# Patient Record
Sex: FEMALE | Race: WHITE | Employment: UNEMPLOYED | URBAN - METROPOLITAN AREA
[De-identification: names, ages, dates, MRNs, and addresses within clinical notes are randomized per-mention and may not be internally consistent; named-entity substitution may affect disease eponyms.]

---

## 2017-01-10 ENCOUNTER — OFFICE VISIT (OUTPATIENT)
Dept: INTERNAL MEDICINE | Age: 57
End: 2017-01-10

## 2017-01-10 VITALS
DIASTOLIC BLOOD PRESSURE: 88 MMHG | HEIGHT: 57 IN | HEART RATE: 60 BPM | SYSTOLIC BLOOD PRESSURE: 120 MMHG | TEMPERATURE: 96.9 F

## 2017-01-10 DIAGNOSIS — R46.89 BEHAVIORAL CHANGE: ICD-10-CM

## 2017-01-10 DIAGNOSIS — E66.2 MORBID OBESITY WITH ALVEOLAR HYPOVENTILATION (HCC): Chronic | ICD-10-CM

## 2017-01-10 DIAGNOSIS — Z87.09 HISTORY OF ASPIRATION PNEUMONITIS: Primary | ICD-10-CM

## 2017-01-10 LAB
BASOPHILS ABSOLUTE: 0 /ΜL
BASOPHILS RELATIVE PERCENT: 0.4 %
EOSINOPHILS ABSOLUTE: 0.2 /ΜL
EOSINOPHILS RELATIVE PERCENT: 6 %
HCT VFR BLD CALC: 40 % (ref 36–46)
HEMOGLOBIN: 13.2 G/DL (ref 12–16)
LYMPHOCYTES ABSOLUTE: 2 /ΜL
LYMPHOCYTES RELATIVE PERCENT: 47 %
MCH RBC QN AUTO: 34 PG
MCHC RBC AUTO-ENTMCNC: 33.2 G/DL
MCV RBC AUTO: 102.5 FL
MONOCYTES ABSOLUTE: 0.5 /ΜL
MONOCYTES RELATIVE PERCENT: 13.2 %
NEUTROPHILS ABSOLUTE: 1.2 /ΜL
NEUTROPHILS RELATIVE PERCENT: 26 %
PDW BLD-RTO: 15 %
PLATELET # BLD: 58 K/ΜL
PMV BLD AUTO: NORMAL FL
RBC # BLD: 3.9 10^6/ΜL
WBC # BLD: 3.9 10^3/ML

## 2017-01-10 PROCEDURE — 99213 OFFICE O/P EST LOW 20 MIN: CPT | Performed by: INTERNAL MEDICINE

## 2017-01-10 ASSESSMENT — ENCOUNTER SYMPTOMS
EYE REDNESS: 0
SHORTNESS OF BREATH: 1
WHEEZING: 0
COUGH: 0
ABDOMINAL DISTENTION: 0
VOICE CHANGE: 0
BLOOD IN STOOL: 0
TROUBLE SWALLOWING: 1

## 2017-02-07 LAB
BASOPHILS ABSOLUTE: 0 /ΜL
BASOPHILS RELATIVE PERCENT: 0.5 %
EOSINOPHILS ABSOLUTE: 0.1 /ΜL
EOSINOPHILS RELATIVE PERCENT: 1 %
HCT VFR BLD CALC: 39.5 % (ref 36–46)
HEMOGLOBIN: 12.5 G/DL (ref 12–16)
LYMPHOCYTES ABSOLUTE: 2.9 /ΜL
LYMPHOCYTES RELATIVE PERCENT: 47.9 %
MCH RBC QN AUTO: 33.4 PG
MCHC RBC AUTO-ENTMCNC: 31.7 G/DL
MCV RBC AUTO: 105.2 FL
MONOCYTES ABSOLUTE: 0.9 /ΜL
MONOCYTES RELATIVE PERCENT: 15.3 %
NEUTROPHILS ABSOLUTE: 2.1 /ΜL
NEUTROPHILS RELATIVE PERCENT: 35.3 %
PDW BLD-RTO: 15.3 %
PLATELET # BLD: 70 K/ΜL
PMV BLD AUTO: NORMAL FL
RBC # BLD: 3.75 10^6/ΜL
WBC # BLD: 6 10^3/ML

## 2017-02-14 LAB
BASOPHILS ABSOLUTE: 0 /ΜL
BASOPHILS RELATIVE PERCENT: 0.8 %
EOSINOPHILS ABSOLUTE: 0.1 /ΜL
EOSINOPHILS RELATIVE PERCENT: 1.9 %
HCT VFR BLD CALC: 37.5 % (ref 36–46)
HEMOGLOBIN: 12.7 G/DL (ref 12–16)
LYMPHOCYTES ABSOLUTE: 2.4 /ΜL
LYMPHOCYTES RELATIVE PERCENT: 43.3 %
MCH RBC QN AUTO: 34.5 PG
MCHC RBC AUTO-ENTMCNC: 34 G/DL
MCV RBC AUTO: 101.5 FL
MONOCYTES ABSOLUTE: 0.7 /ΜL
MONOCYTES RELATIVE PERCENT: 12.5 %
NEUTROPHILS ABSOLUTE: 2.3 /ΜL
NEUTROPHILS RELATIVE PERCENT: 41.5 %
PDW BLD-RTO: 14.2 %
PLATELET # BLD: 72 K/ΜL
PMV BLD AUTO: NORMAL FL
RBC # BLD: 3.7 10^6/ΜL
WBC # BLD: 5.6 10^3/ML

## 2017-02-17 LAB
BASOPHILS ABSOLUTE: 0 /ΜL
BASOPHILS RELATIVE PERCENT: 0.6 %
EOSINOPHILS ABSOLUTE: 0.1 /ΜL
EOSINOPHILS RELATIVE PERCENT: 1.9 %
HCT VFR BLD CALC: 37.7 % (ref 36–46)
HEMOGLOBIN: 12.8 G/DL (ref 12–16)
LYMPHOCYTES ABSOLUTE: 2.4 /ΜL
LYMPHOCYTES RELATIVE PERCENT: 39.7 %
MCH RBC QN AUTO: 34.1 PG
MCHC RBC AUTO-ENTMCNC: 33.8 G/DL
MCV RBC AUTO: 100.7 FL
MONOCYTES ABSOLUTE: 0.8 /ΜL
MONOCYTES RELATIVE PERCENT: 12.4 %
NEUTROPHILS ABSOLUTE: 2.8 /ΜL
NEUTROPHILS RELATIVE PERCENT: 45.4 %
PDW BLD-RTO: 14 %
PLATELET # BLD: 67 K/ΜL
PMV BLD AUTO: NORMAL FL
RBC # BLD: 3.75 10^6/ΜL
WBC # BLD: 6.1 10^3/ML

## 2017-03-14 ENCOUNTER — TELEPHONE (OUTPATIENT)
Dept: SURGERY | Age: 57
End: 2017-03-14

## 2017-03-14 DIAGNOSIS — R13.10 DYSPHAGIA, UNSPECIFIED TYPE: Primary | ICD-10-CM

## 2017-03-21 LAB
BASOPHILS ABSOLUTE: 0 /ΜL
BASOPHILS RELATIVE PERCENT: 0.4 %
EOSINOPHILS ABSOLUTE: 0.1 /ΜL
EOSINOPHILS RELATIVE PERCENT: 1.3 %
HCT VFR BLD CALC: 40.3 % (ref 36–46)
HEMOGLOBIN: 13.7 G/DL (ref 12–16)
LYMPHOCYTES ABSOLUTE: 2.3 /ΜL
LYMPHOCYTES RELATIVE PERCENT: 30.2 %
MCH RBC QN AUTO: 33.9 PG
MCHC RBC AUTO-ENTMCNC: 34.1 G/DL
MCV RBC AUTO: 99.6 FL
MONOCYTES ABSOLUTE: 1 /ΜL
MONOCYTES RELATIVE PERCENT: 13.1 %
NEUTROPHILS ABSOLUTE: 4.1 /ΜL
NEUTROPHILS RELATIVE PERCENT: 55 %
PDW BLD-RTO: 13 %
PLATELET # BLD: 82 K/ΜL
PMV BLD AUTO: NORMAL FL
RBC # BLD: 4.05 10^6/ΜL
WBC # BLD: 7.5 10^3/ML

## 2017-03-24 ENCOUNTER — HOSPITAL ENCOUNTER (OUTPATIENT)
Dept: INFUSION THERAPY | Age: 57
Setting detail: INFUSION SERIES
Discharge: HOME | End: 2017-03-24
Payer: MEDICARE

## 2017-03-24 PROCEDURE — 43760 CHANGE GASTROSTOMY TUBE PERCUTANEOUS W/O GUIDE: CPT | Performed by: SURGERY

## 2017-03-24 PROCEDURE — 43760 HC CHANGE OF GASTROSTOMY TUBE: CPT

## 2017-03-24 NOTE — PROGRESS NOTES
Arrived to [facility] via wheelchair accompanied by caregiver. She is here to get her gastric tube changed.

## 2017-03-25 NOTE — PROCEDURES
[facility]                        [address] [place], [address] [place] [address]                                    PROCEDURE NOTE    PATIENT NAME:  Igor Schaeffer                    :      1960  MED REC NO:    [id number]                         ROOM:     [facility]   ACCOUNT NO:    [de-identified]                        ADMISSION DATE: 2017  PHYSICIAN:     Channing Vanessa MD                   DATE OF PROCEDURE:  2017    PREOPERATIVE DIAGNOSES:  Mechanical complication gastrostomy tube, dysphagia. POSTOPERATIVE DIAGNOSES:  Mechanical complication gastrostomy tube, dysphagia. PROCEDURE PERFORMED:  Change of G-tube. SURGEON:  Dr. Zhanna Gaytan. CLINICAL NOTE:  This woman receives her nutrition and medication support  through a G-tube. The plastic on the tube is starting to break down, so the  tube needs replaced. She has had this done in the past.    PROCEDURE NOTE:  The procedure was done in the outpatient infusion area. She  remains in her wheelchair. The tube is positioned high in the epigastric  area, right underneath the bra. The tube was removed intact, and discarded. A new tube was lubricated and inserted with some difficulty. The patient was  not cooperative at first and had to be restrained. The tube was placed. It  flushed easily. There was return of gastric contents. The site was dressed  and she left the suite in good condition. Nutrition and medication support  will be restarted and the tube will be changed p.r.n.         Channing Vanessa MD      D: 2017 14:40:21  T: 2017 16:51:04  BP/nts  Job#: [id number]  Doc#: [id number]

## 2017-04-11 LAB
BASOPHILS ABSOLUTE: 0 /ΜL
BASOPHILS RELATIVE PERCENT: 0.5 %
EOSINOPHILS ABSOLUTE: 0.1 /ΜL
EOSINOPHILS RELATIVE PERCENT: 2.2 %
HCT VFR BLD CALC: 38 % (ref 36–46)
HEMOGLOBIN: 12.8 G/DL (ref 12–16)
LYMPHOCYTES ABSOLUTE: 2.5 /ΜL
LYMPHOCYTES RELATIVE PERCENT: 43 %
MCH RBC QN AUTO: 33.4 PG
MCHC RBC AUTO-ENTMCNC: 33.7 G/DL
MCV RBC AUTO: 99.2 FL
MONOCYTES ABSOLUTE: 0.8 /ΜL
MONOCYTES RELATIVE PERCENT: 13 %
NEUTROPHILS ABSOLUTE: 2.4 /ΜL
NEUTROPHILS RELATIVE PERCENT: 41.3 %
PDW BLD-RTO: 12.6 %
PLATELET # BLD: 92 K/ΜL
PMV BLD AUTO: NORMAL FL
RBC # BLD: 3.83 10^6/ΜL
WBC # BLD: 5.8 10^3/ML

## 2017-04-18 LAB
BASOPHILS ABSOLUTE: 0 /ΜL
BASOPHILS RELATIVE PERCENT: 0.4 %
EOSINOPHILS ABSOLUTE: 0.1 /ΜL
EOSINOPHILS RELATIVE PERCENT: 1.5 %
HCT VFR BLD CALC: 39.2 % (ref 36–46)
HEMOGLOBIN: 13.5 G/DL (ref 12–16)
LYMPHOCYTES ABSOLUTE: 2 /ΜL
LYMPHOCYTES RELATIVE PERCENT: 26.4 %
MCH RBC QN AUTO: 34 PG
MCHC RBC AUTO-ENTMCNC: 34.5 G/DL
MCV RBC AUTO: 98.4 FL
MONOCYTES ABSOLUTE: 0.9 /ΜL
MONOCYTES RELATIVE PERCENT: 12.1 %
NEUTROPHILS ABSOLUTE: 4.4 /ΜL
NEUTROPHILS RELATIVE PERCENT: 59.6 %
PDW BLD-RTO: 12.7 %
PLATELET # BLD: 77 K/ΜL
PMV BLD AUTO: NORMAL FL
RBC # BLD: 3.98 10^6/ΜL
WBC # BLD: 7.4 10^3/ML

## 2017-04-25 ENCOUNTER — OFFICE VISIT (OUTPATIENT)
Dept: CARDIOLOGY | Age: 57
End: 2017-04-25

## 2017-04-25 VITALS
DIASTOLIC BLOOD PRESSURE: 70 MMHG | WEIGHT: 185 LBS | OXYGEN SATURATION: 92 % | SYSTOLIC BLOOD PRESSURE: 102 MMHG | HEART RATE: 96 BPM | RESPIRATION RATE: 16 BRPM

## 2017-04-25 DIAGNOSIS — I50.30 DIASTOLIC HEART FAILURE, NYHA CLASS 1 (HCC): ICD-10-CM

## 2017-04-25 DIAGNOSIS — I10 ESSENTIAL HYPERTENSION: Primary | ICD-10-CM

## 2017-04-25 DIAGNOSIS — I95.1 CHRONIC ORTHOSTATIC HYPOTENSION: ICD-10-CM

## 2017-04-25 DIAGNOSIS — R00.0 SINUS TACHYCARDIA: ICD-10-CM

## 2017-04-25 PROCEDURE — 1036F TOBACCO NON-USER: CPT | Performed by: INTERNAL MEDICINE

## 2017-04-25 PROCEDURE — G8417 CALC BMI ABV UP PARAM F/U: HCPCS | Performed by: INTERNAL MEDICINE

## 2017-04-25 PROCEDURE — 3014F SCREEN MAMMO DOC REV: CPT | Performed by: INTERNAL MEDICINE

## 2017-04-25 PROCEDURE — 99213 OFFICE O/P EST LOW 20 MIN: CPT | Performed by: INTERNAL MEDICINE

## 2017-04-25 PROCEDURE — 3017F COLORECTAL CA SCREEN DOC REV: CPT | Performed by: INTERNAL MEDICINE

## 2017-04-25 PROCEDURE — G8427 DOCREV CUR MEDS BY ELIG CLIN: HCPCS | Performed by: INTERNAL MEDICINE

## 2017-04-25 ASSESSMENT — ENCOUNTER SYMPTOMS
CHEST TIGHTNESS: 0
APNEA: 0
COUGH: 0
COLOR CHANGE: 0
SHORTNESS OF BREATH: 0

## 2017-05-10 ENCOUNTER — OFFICE VISIT (OUTPATIENT)
Dept: SURGERY | Age: 57
End: 2017-05-10

## 2017-05-10 VITALS — SYSTOLIC BLOOD PRESSURE: 130 MMHG | HEIGHT: 57 IN | DIASTOLIC BLOOD PRESSURE: 86 MMHG | HEART RATE: 90 BPM

## 2017-05-10 DIAGNOSIS — E03.9 HYPOTHYROIDISM, UNSPECIFIED TYPE: Primary | ICD-10-CM

## 2017-05-10 PROCEDURE — 3014F SCREEN MAMMO DOC REV: CPT | Performed by: INTERNAL MEDICINE

## 2017-05-10 PROCEDURE — 1036F TOBACCO NON-USER: CPT | Performed by: INTERNAL MEDICINE

## 2017-05-10 PROCEDURE — 3017F COLORECTAL CA SCREEN DOC REV: CPT | Performed by: INTERNAL MEDICINE

## 2017-05-10 PROCEDURE — G8417 CALC BMI ABV UP PARAM F/U: HCPCS | Performed by: INTERNAL MEDICINE

## 2017-05-10 PROCEDURE — 99213 OFFICE O/P EST LOW 20 MIN: CPT | Performed by: INTERNAL MEDICINE

## 2017-05-10 PROCEDURE — G8427 DOCREV CUR MEDS BY ELIG CLIN: HCPCS | Performed by: INTERNAL MEDICINE

## 2017-05-24 ENCOUNTER — OFFICE VISIT (OUTPATIENT)
Dept: INTERNAL MEDICINE | Age: 57
End: 2017-05-24

## 2017-05-24 VITALS
DIASTOLIC BLOOD PRESSURE: 88 MMHG | SYSTOLIC BLOOD PRESSURE: 120 MMHG | HEART RATE: 83 BPM | OXYGEN SATURATION: 95 % | TEMPERATURE: 96 F

## 2017-05-24 DIAGNOSIS — Z78.9 IMPAIRED MOBILITY AND ACTIVITIES OF DAILY LIVING: Primary | Chronic | ICD-10-CM

## 2017-05-24 DIAGNOSIS — Z00.00 ANNUAL PHYSICAL EXAM: ICD-10-CM

## 2017-05-24 DIAGNOSIS — D69.6 THROMBOCYTOPENIA (HCC): ICD-10-CM

## 2017-05-24 DIAGNOSIS — Z74.09 IMPAIRED MOBILITY AND ACTIVITIES OF DAILY LIVING: Primary | Chronic | ICD-10-CM

## 2017-05-24 DIAGNOSIS — M17.0 PRIMARY OSTEOARTHRITIS OF BOTH KNEES: Chronic | ICD-10-CM

## 2017-05-24 DIAGNOSIS — F31.62 BIPOLAR DISORDER, CURRENT EPISODE MIXED, MODERATE (HCC): ICD-10-CM

## 2017-05-24 PROCEDURE — 1036F TOBACCO NON-USER: CPT | Performed by: INTERNAL MEDICINE

## 2017-05-24 PROCEDURE — 99213 OFFICE O/P EST LOW 20 MIN: CPT | Performed by: INTERNAL MEDICINE

## 2017-05-24 PROCEDURE — 3017F COLORECTAL CA SCREEN DOC REV: CPT | Performed by: INTERNAL MEDICINE

## 2017-05-24 PROCEDURE — G8427 DOCREV CUR MEDS BY ELIG CLIN: HCPCS | Performed by: INTERNAL MEDICINE

## 2017-05-24 PROCEDURE — G8417 CALC BMI ABV UP PARAM F/U: HCPCS | Performed by: INTERNAL MEDICINE

## 2017-05-24 PROCEDURE — 3014F SCREEN MAMMO DOC REV: CPT | Performed by: INTERNAL MEDICINE

## 2017-06-06 LAB
BASOPHILS ABSOLUTE: 0 K/UL (ref 0–0.2)
BASOPHILS RELATIVE PERCENT: 0.4 %
EOSINOPHILS ABSOLUTE: 0.2 K/UL (ref 0–0.7)
EOSINOPHILS RELATIVE PERCENT: 2.8 %
HCT VFR BLD CALC: 39.3 % (ref 37–47)
HEMOGLOBIN: 12.8 G/DL (ref 12–16)
LYMPHOCYTES ABSOLUTE: 2.4 K/UL (ref 1–4.8)
LYMPHOCYTES RELATIVE PERCENT: 40 %
MCH RBC QN AUTO: 32.3 PG (ref 27–31.3)
MCHC RBC AUTO-ENTMCNC: 32.7 % (ref 33–37)
MCV RBC AUTO: 98.7 FL (ref 82–100)
MONOCYTES ABSOLUTE: 0.8 K/UL (ref 0.2–0.8)
MONOCYTES RELATIVE PERCENT: 13.1 %
NEUTROPHILS ABSOLUTE: 2.7 K/UL (ref 1.4–6.5)
NEUTROPHILS RELATIVE PERCENT: 43.7 %
PDW BLD-RTO: 13.5 % (ref 11.5–14.5)
PLATELET # BLD: 77 K/UL (ref 130–400)
PLATELET SLIDE REVIEW: ABNORMAL
RBC # BLD: 3.98 M/UL (ref 4.2–5.4)
SLIDE REVIEW: ABNORMAL
WBC # BLD: 6.1 K/UL (ref 4.8–10.8)

## 2017-06-06 ASSESSMENT — ENCOUNTER SYMPTOMS
WHEEZING: 0
ABDOMINAL DISTENTION: 0
BLOOD IN STOOL: 0
EYE REDNESS: 0
COUGH: 0
TROUBLE SWALLOWING: 1
SHORTNESS OF BREATH: 1
VOICE CHANGE: 0

## 2017-06-13 LAB
BASOPHILS ABSOLUTE: 0.1 K/UL (ref 0–0.2)
BASOPHILS RELATIVE PERCENT: 0.7 %
EOSINOPHILS ABSOLUTE: 0.2 K/UL (ref 0–0.7)
EOSINOPHILS RELATIVE PERCENT: 1.6 %
HCT VFR BLD CALC: 44.4 % (ref 37–47)
HEMOGLOBIN: 14.9 G/DL (ref 12–16)
LYMPHOCYTES ABSOLUTE: 2.4 K/UL (ref 1–4.8)
LYMPHOCYTES RELATIVE PERCENT: 22.1 %
MCH RBC QN AUTO: 32.6 PG (ref 27–31.3)
MCHC RBC AUTO-ENTMCNC: 33.6 % (ref 33–37)
MCV RBC AUTO: 96.9 FL (ref 82–100)
MONOCYTES ABSOLUTE: 1.2 K/UL (ref 0.2–0.8)
MONOCYTES RELATIVE PERCENT: 11.1 %
NEUTROPHILS ABSOLUTE: 6.9 K/UL (ref 1.4–6.5)
NEUTROPHILS RELATIVE PERCENT: 64.5 %
PDW BLD-RTO: 13.7 % (ref 11.5–14.5)
PLATELET # BLD: 96 K/UL (ref 130–400)
RBC # BLD: 4.58 M/UL (ref 4.2–5.4)
WBC # BLD: 10.8 K/UL (ref 4.8–10.8)

## 2017-06-14 DIAGNOSIS — Z12.31 VISIT FOR SCREENING MAMMOGRAM: Primary | ICD-10-CM

## 2017-06-20 LAB
BASOPHILS ABSOLUTE: 0 K/UL (ref 0–0.2)
BASOPHILS RELATIVE PERCENT: 0.4 %
EOSINOPHILS ABSOLUTE: 0.1 K/UL (ref 0–0.7)
EOSINOPHILS RELATIVE PERCENT: 2.4 %
HCT VFR BLD CALC: 41.3 % (ref 37–47)
HEMOGLOBIN: 13.5 G/DL (ref 12–16)
LYMPHOCYTES ABSOLUTE: 2 K/UL (ref 1–4.8)
LYMPHOCYTES RELATIVE PERCENT: 35 %
MCH RBC QN AUTO: 32.2 PG (ref 27–31.3)
MCHC RBC AUTO-ENTMCNC: 32.6 % (ref 33–37)
MCV RBC AUTO: 98.6 FL (ref 82–100)
MONOCYTES ABSOLUTE: 0.8 K/UL (ref 0.2–0.8)
MONOCYTES RELATIVE PERCENT: 12.9 %
NEUTROPHILS ABSOLUTE: 2.9 K/UL (ref 1.4–6.5)
NEUTROPHILS RELATIVE PERCENT: 49.3 %
PDW BLD-RTO: 13.3 % (ref 11.5–14.5)
PLATELET # BLD: 85 K/UL (ref 130–400)
PLATELET SLIDE REVIEW: ABNORMAL
RBC # BLD: 4.19 M/UL (ref 4.2–5.4)
SLIDE REVIEW: ABNORMAL
WBC # BLD: 5.8 K/UL (ref 4.8–10.8)

## 2017-06-23 ENCOUNTER — CARE COORDINATION (OUTPATIENT)
Dept: CARE COORDINATION | Age: 57
End: 2017-06-23

## 2017-06-27 LAB
BASOPHILS ABSOLUTE: 0 K/UL (ref 0–0.2)
BASOPHILS RELATIVE PERCENT: 0.4 %
EOSINOPHILS ABSOLUTE: 0.2 K/UL (ref 0–0.7)
EOSINOPHILS RELATIVE PERCENT: 2.2 %
HCT VFR BLD CALC: 39.6 % (ref 37–47)
HEMOGLOBIN: 13.1 G/DL (ref 12–16)
LYMPHOCYTES ABSOLUTE: 2 K/UL (ref 1–4.8)
LYMPHOCYTES RELATIVE PERCENT: 28.1 %
MCH RBC QN AUTO: 32.4 PG (ref 27–31.3)
MCHC RBC AUTO-ENTMCNC: 33.2 % (ref 33–37)
MCV RBC AUTO: 97.5 FL (ref 82–100)
MONOCYTES ABSOLUTE: 0.9 K/UL (ref 0.2–0.8)
MONOCYTES RELATIVE PERCENT: 12.8 %
NEUTROPHILS ABSOLUTE: 3.9 K/UL (ref 1.4–6.5)
NEUTROPHILS RELATIVE PERCENT: 56.5 %
PDW BLD-RTO: 13.7 % (ref 11.5–14.5)
PLATELET # BLD: 67 K/UL (ref 130–400)
PLATELET SLIDE REVIEW: ABNORMAL
RBC # BLD: 4.06 M/UL (ref 4.2–5.4)
WBC # BLD: 7 K/UL (ref 4.8–10.8)

## 2017-07-03 LAB
BASOPHILS ABSOLUTE: 0 K/UL (ref 0–0.2)
BASOPHILS RELATIVE PERCENT: 0.4 %
EOSINOPHILS ABSOLUTE: 0.1 K/UL (ref 0–0.7)
EOSINOPHILS RELATIVE PERCENT: 1.8 %
HCT VFR BLD CALC: 44.1 % (ref 37–47)
HEMOGLOBIN: 14.3 G/DL (ref 12–16)
LYMPHOCYTES ABSOLUTE: 1.9 K/UL (ref 1–4.8)
LYMPHOCYTES RELATIVE PERCENT: 24.7 %
MCH RBC QN AUTO: 32.5 PG (ref 27–31.3)
MCHC RBC AUTO-ENTMCNC: 32.5 % (ref 33–37)
MCV RBC AUTO: 100.1 FL (ref 82–100)
MONOCYTES ABSOLUTE: 0.9 K/UL (ref 0.2–0.8)
MONOCYTES RELATIVE PERCENT: 11.3 %
NEUTROPHILS ABSOLUTE: 4.8 K/UL (ref 1.4–6.5)
NEUTROPHILS RELATIVE PERCENT: 61.8 %
PDW BLD-RTO: 14.2 % (ref 11.5–14.5)
PLATELET # BLD: 101 K/UL (ref 130–400)
RBC # BLD: 4.4 M/UL (ref 4.2–5.4)
WBC # BLD: 7.8 K/UL (ref 4.8–10.8)

## 2017-07-11 LAB
BASOPHILS ABSOLUTE: 0.1 K/UL (ref 0–0.2)
BASOPHILS RELATIVE PERCENT: 0.7 %
EOSINOPHILS ABSOLUTE: 0.1 K/UL (ref 0–0.7)
EOSINOPHILS RELATIVE PERCENT: 1.5 %
HCT VFR BLD CALC: 40.5 % (ref 37–47)
HEMOGLOBIN: 13.7 G/DL (ref 12–16)
LYMPHOCYTES ABSOLUTE: 2.4 K/UL (ref 1–4.8)
LYMPHOCYTES RELATIVE PERCENT: 29.7 %
MCH RBC QN AUTO: 33.3 PG (ref 27–31.3)
MCHC RBC AUTO-ENTMCNC: 33.9 % (ref 33–37)
MCV RBC AUTO: 98.2 FL (ref 82–100)
MONOCYTES ABSOLUTE: 1 K/UL (ref 0.2–0.8)
MONOCYTES RELATIVE PERCENT: 13 %
NEUTROPHILS ABSOLUTE: 4.4 K/UL (ref 1.4–6.5)
NEUTROPHILS RELATIVE PERCENT: 55.1 %
PDW BLD-RTO: 14.5 % (ref 11.5–14.5)
PLATELET # BLD: 291 K/UL (ref 130–400)
RBC # BLD: 4.13 M/UL (ref 4.2–5.4)
WBC # BLD: 8 K/UL (ref 4.8–10.8)

## 2017-07-18 ENCOUNTER — HOSPITAL ENCOUNTER (OUTPATIENT)
Dept: WOMENS IMAGING | Age: 57
Discharge: HOME | End: 2017-07-18
Payer: MEDICARE

## 2017-07-18 DIAGNOSIS — Z12.31 VISIT FOR SCREENING MAMMOGRAM: ICD-10-CM

## 2017-07-18 LAB
ACANTHOCYTES: 0
ANISOCYTOSIS: 0
AUER RODS: 0
BANDED NEUTROPHILS RELATIVE PERCENT: 2 % (ref 5–11)
BASOPHILIC STIPPLING: 0
BASOPHILS ABSOLUTE: 0.1 K/UL (ref 0–0.2)
BASOPHILS RELATIVE PERCENT: 1 %
BURR CELLS: 0
CABOT RINGS: 0
DOHLE BODIES: 0
EOSINOPHILS ABSOLUTE: 0.2 K/UL (ref 0–0.7)
EOSINOPHILS RELATIVE PERCENT: 3 %
HAIRY CELLS: 0
HCT VFR BLD CALC: 39.5 % (ref 37–47)
HEMOGLOBIN: 13.2 G/DL (ref 12–16)
HOWELL-JOLLY BODIES: 0
HYPERSEGMENTED NEUTROPHILS: 0
HYPOCHROMIA: 0
LYMPHOCYTES ABSOLUTE: 1.2 K/UL (ref 1–4.8)
LYMPHOCYTES RELATIVE PERCENT: 19 %
MACROCYTES: 0
MCH RBC QN AUTO: 32.6 PG (ref 27–31.3)
MCHC RBC AUTO-ENTMCNC: 33.4 % (ref 33–37)
MCV RBC AUTO: 97.6 FL (ref 82–100)
METAMYELOCYTES RELATIVE PERCENT: 2 %
MICROCYTES: 0
MONOCYTES ABSOLUTE: 0.9 K/UL (ref 0.2–0.8)
MONOCYTES RELATIVE PERCENT: 14 %
NEUTROPHILS ABSOLUTE: 3.9 K/UL (ref 1.4–6.5)
NEUTROPHILS RELATIVE PERCENT: 59 %
OVALOCYTES: 0
PAPPENHEIMER BODIES: 0
PDW BLD-RTO: 14.2 % (ref 11.5–14.5)
PELGER HUET CELLS: 0 %
PLATELET # BLD: 117 K/UL (ref 130–400)
PLATELET SLIDE REVIEW: ABNORMAL
POIKILOCYTES: 0
POLYCHROMASIA: 0
RBC # BLD: 4.04 M/UL (ref 4.2–5.4)
RBC # BLD: NORMAL 10*6/UL
SCHISTOCYTES: 0
SICKLE CELLS: 0
SMUDGE CELLS: 7.8
SPHEROCYTES: 0
STOMATOCYTES: 0
TARGET CELLS: 0
TEAR DROP CELLS: 0
TOXIC GRANULATION: 0
VACUOLATED NEUTROPHILS: 0
WBC # BLD: 6.2 K/UL (ref 4.8–10.8)

## 2017-07-18 PROCEDURE — G0202 SCR MAMMO BI INCL CAD: HCPCS

## 2017-07-19 RX ORDER — NYSTATIN 100000 U/G
CREAM TOPICAL
Qty: 30 G | Refills: 1 | Status: SHIPPED | OUTPATIENT
Start: 2017-07-19

## 2017-07-25 LAB
BASOPHILS ABSOLUTE: 0 K/UL (ref 0–0.2)
BASOPHILS RELATIVE PERCENT: 0.4 %
EOSINOPHILS ABSOLUTE: 0.2 K/UL (ref 0–0.7)
EOSINOPHILS RELATIVE PERCENT: 2.3 %
HCT VFR BLD CALC: 39 % (ref 37–47)
HEMOGLOBIN: 12.9 G/DL (ref 12–16)
LYMPHOCYTES ABSOLUTE: 2.6 K/UL (ref 1–4.8)
LYMPHOCYTES RELATIVE PERCENT: 37.4 %
MCH RBC QN AUTO: 32.7 PG (ref 27–31.3)
MCHC RBC AUTO-ENTMCNC: 33.2 % (ref 33–37)
MCV RBC AUTO: 98.6 FL (ref 82–100)
MONOCYTES ABSOLUTE: 0.9 K/UL (ref 0.2–0.8)
MONOCYTES RELATIVE PERCENT: 13.2 %
NEUTROPHILS ABSOLUTE: 3.2 K/UL (ref 1.4–6.5)
NEUTROPHILS RELATIVE PERCENT: 46.7 %
PDW BLD-RTO: 13.7 % (ref 11.5–14.5)
PLATELET # BLD: 83 K/UL (ref 130–400)
PLATELET SLIDE REVIEW: ABNORMAL
RBC # BLD: 3.96 M/UL (ref 4.2–5.4)
WBC # BLD: 6.9 K/UL (ref 4.8–10.8)

## 2017-08-15 LAB
BASOPHILS ABSOLUTE: 0.1 K/UL (ref 0–0.2)
BASOPHILS RELATIVE PERCENT: 0.9 %
EOSINOPHILS ABSOLUTE: 0.1 K/UL (ref 0–0.7)
EOSINOPHILS RELATIVE PERCENT: 1.9 %
HCT VFR BLD CALC: 39.2 % (ref 37–47)
HEMOGLOBIN: 12.9 G/DL (ref 12–16)
LYMPHOCYTES ABSOLUTE: 2.2 K/UL (ref 1–4.8)
LYMPHOCYTES RELATIVE PERCENT: 31 %
MCH RBC QN AUTO: 32.5 PG (ref 27–31.3)
MCHC RBC AUTO-ENTMCNC: 33 % (ref 33–37)
MCV RBC AUTO: 98.6 FL (ref 82–100)
MONOCYTES ABSOLUTE: 1 K/UL (ref 0.2–0.8)
MONOCYTES RELATIVE PERCENT: 14 %
NEUTROPHILS ABSOLUTE: 3.7 K/UL (ref 1.4–6.5)
NEUTROPHILS RELATIVE PERCENT: 52.2 %
PDW BLD-RTO: 13.8 % (ref 11.5–14.5)
PLATELET # BLD: 72 K/UL (ref 130–400)
RBC # BLD: 3.97 M/UL (ref 4.2–5.4)
WBC # BLD: 7.2 K/UL (ref 4.8–10.8)

## 2017-08-23 ENCOUNTER — OFFICE VISIT (OUTPATIENT)
Dept: INTERNAL MEDICINE | Age: 57
End: 2017-08-23

## 2017-08-23 VITALS
OXYGEN SATURATION: 93 % | TEMPERATURE: 97.6 F | SYSTOLIC BLOOD PRESSURE: 112 MMHG | HEART RATE: 88 BPM | DIASTOLIC BLOOD PRESSURE: 80 MMHG

## 2017-08-23 DIAGNOSIS — R13.12 OROPHARYNGEAL DYSPHAGIA: Primary | Chronic | ICD-10-CM

## 2017-08-23 DIAGNOSIS — R60.0 BILATERAL EDEMA OF LOWER EXTREMITY: Chronic | ICD-10-CM

## 2017-08-23 PROCEDURE — 99213 OFFICE O/P EST LOW 20 MIN: CPT | Performed by: INTERNAL MEDICINE

## 2017-08-23 PROCEDURE — 3017F COLORECTAL CA SCREEN DOC REV: CPT | Performed by: INTERNAL MEDICINE

## 2017-08-23 PROCEDURE — 3014F SCREEN MAMMO DOC REV: CPT | Performed by: INTERNAL MEDICINE

## 2017-08-23 PROCEDURE — 1036F TOBACCO NON-USER: CPT | Performed by: INTERNAL MEDICINE

## 2017-08-23 PROCEDURE — G8421 BMI NOT CALCULATED: HCPCS | Performed by: INTERNAL MEDICINE

## 2017-08-23 PROCEDURE — G8427 DOCREV CUR MEDS BY ELIG CLIN: HCPCS | Performed by: INTERNAL MEDICINE

## 2017-08-23 RX ORDER — FUROSEMIDE 20 MG/1
20 TABLET ORAL EVERY MORNING
Qty: 30 TABLET | Refills: 1
Start: 2017-08-23 | End: 2018-06-12

## 2017-08-23 ASSESSMENT — ENCOUNTER SYMPTOMS
COLOR CHANGE: 0
SHORTNESS OF BREATH: 1
VOICE CHANGE: 0
WHEEZING: 0
CHOKING: 1
EYE REDNESS: 0
COUGH: 0
ABDOMINAL DISTENTION: 0
TROUBLE SWALLOWING: 1
BLOOD IN STOOL: 0

## 2017-08-30 ENCOUNTER — OFFICE VISIT (OUTPATIENT)
Dept: INTERNAL MEDICINE | Age: 57
End: 2017-08-30

## 2017-08-30 ENCOUNTER — HOSPITAL ENCOUNTER (OUTPATIENT)
Dept: GENERAL RADIOLOGY | Age: 57
Discharge: HOME | End: 2017-08-30
Payer: MEDICARE

## 2017-08-30 VITALS — HEART RATE: 86 BPM | OXYGEN SATURATION: 90 % | DIASTOLIC BLOOD PRESSURE: 80 MMHG | SYSTOLIC BLOOD PRESSURE: 120 MMHG

## 2017-08-30 DIAGNOSIS — R05.9 COUGH: Primary | ICD-10-CM

## 2017-08-30 DIAGNOSIS — R05.9 COUGH: ICD-10-CM

## 2017-08-30 PROCEDURE — G8421 BMI NOT CALCULATED: HCPCS | Performed by: INTERNAL MEDICINE

## 2017-08-30 PROCEDURE — 3017F COLORECTAL CA SCREEN DOC REV: CPT | Performed by: INTERNAL MEDICINE

## 2017-08-30 PROCEDURE — 99213 OFFICE O/P EST LOW 20 MIN: CPT | Performed by: INTERNAL MEDICINE

## 2017-08-30 PROCEDURE — 1036F TOBACCO NON-USER: CPT | Performed by: INTERNAL MEDICINE

## 2017-08-30 PROCEDURE — 71020 XR CHEST STANDARD TWO VW: CPT

## 2017-08-30 PROCEDURE — G8427 DOCREV CUR MEDS BY ELIG CLIN: HCPCS | Performed by: INTERNAL MEDICINE

## 2017-08-30 PROCEDURE — 3014F SCREEN MAMMO DOC REV: CPT | Performed by: INTERNAL MEDICINE

## 2017-08-30 ASSESSMENT — ENCOUNTER SYMPTOMS
VOICE CHANGE: 0
COUGH: 1
TROUBLE SWALLOWING: 1
WHEEZING: 0
SHORTNESS OF BREATH: 0
ABDOMINAL DISTENTION: 0
COLOR CHANGE: 0

## 2017-09-08 ENCOUNTER — HOSPITAL ENCOUNTER (OUTPATIENT)
Dept: GENERAL RADIOLOGY | Age: 57
Discharge: HOME | End: 2017-09-08
Payer: MEDICARE

## 2017-09-08 DIAGNOSIS — R13.12 OROPHARYNGEAL DYSPHAGIA: Chronic | ICD-10-CM

## 2017-09-08 PROCEDURE — 74230 X-RAY XM SWLNG FUNCJ C+: CPT

## 2017-09-08 PROCEDURE — 2500000003 HC RX 250 WO HCPCS: Performed by: RADIOLOGY

## 2017-09-08 RX ADMIN — BARIUM SULFATE 80 ML: 400 SUSPENSION ORAL at 14:03

## 2017-09-08 RX ADMIN — BARIUM SULFATE 50 G: 0.81 POWDER, FOR SUSPENSION ORAL at 14:04

## 2017-09-28 ENCOUNTER — CARE COORDINATION (OUTPATIENT)
Dept: CARE COORDINATION | Age: 57
End: 2017-09-28

## 2017-10-10 ENCOUNTER — HOSPITAL ENCOUNTER (OUTPATIENT)
Dept: NON INVASIVE DIAGNOSTICS | Age: 57
Discharge: HOME | End: 2017-10-10
Payer: MEDICARE

## 2017-10-10 LAB
EKG ATRIAL RATE: 96 BPM
EKG P AXIS: 41 DEGREES
EKG P-R INTERVAL: 130 MS
EKG Q-T INTERVAL: 332 MS
EKG QRS DURATION: 60 MS
EKG QTC CALCULATION (BAZETT): 419 MS
EKG R AXIS: -8 DEGREES
EKG T AXIS: 38 DEGREES
EKG VENTRICULAR RATE: 96 BPM

## 2017-10-10 PROCEDURE — 93005 ELECTROCARDIOGRAM TRACING: CPT

## 2017-10-11 ENCOUNTER — TELEPHONE (OUTPATIENT)
Dept: SURGERY | Age: 57
End: 2017-10-11

## 2017-10-11 DIAGNOSIS — K94.23 GASTROSTOMY MECHANICAL COMPLICATION (HCC): ICD-10-CM

## 2017-10-11 DIAGNOSIS — R13.10 DYSPHAGIA, UNSPECIFIED TYPE: Primary | ICD-10-CM

## 2017-11-01 LAB
BACTERIA: ABNORMAL /HPF
BILIRUBIN URINE: NEGATIVE
BLOOD, URINE: NEGATIVE
CLARITY: ABNORMAL
COLOR: YELLOW
CRYSTALS, UA: ABNORMAL
EPITHELIAL CELLS, UA: ABNORMAL /HPF
GLUCOSE URINE: NEGATIVE MG/DL
KETONES, URINE: NEGATIVE MG/DL
LEUKOCYTE ESTERASE, URINE: ABNORMAL
NITRITE, URINE: NEGATIVE
PH UA: 8 (ref 5–9)
PROTEIN UA: NEGATIVE MG/DL
RBC UA: ABNORMAL /HPF (ref 0–2)
RENAL EPITHELIAL, UA: ABNORMAL /HPF
SPECIFIC GRAVITY UA: 1.01 (ref 1–1.03)
UROBILINOGEN, URINE: 0.2 E.U./DL
WBC UA: ABNORMAL /HPF (ref 0–5)

## 2017-11-02 LAB — URINE CULTURE, ROUTINE: NORMAL

## 2017-11-03 ENCOUNTER — HOSPITAL ENCOUNTER (OUTPATIENT)
Dept: INFUSION THERAPY | Age: 57
Setting detail: INFUSION SERIES
End: 2017-11-03
Payer: MEDICARE

## 2017-11-08 ENCOUNTER — HOSPITAL ENCOUNTER (OUTPATIENT)
Dept: INFUSION THERAPY | Age: 57
Setting detail: INFUSION SERIES
Discharge: HOME | End: 2017-11-08
Payer: MEDICARE

## 2017-11-08 PROCEDURE — 43760 CHANGE GASTROSTOMY TUBE PERCUTANEOUS W/O GUIDE: CPT | Performed by: SURGERY

## 2017-11-08 PROCEDURE — 99212 OFFICE O/P EST SF 10 MIN: CPT

## 2017-11-08 NOTE — PROGRESS NOTES
Pt was here at [facility] via w/c, with caregiver from group home,  for peg tube change. Dr. Gisela Bowser arrived and completed procedure. Pt tolerated with minor difficulty. Pt left unit the same way.

## 2017-12-04 ENCOUNTER — OFFICE VISIT (OUTPATIENT)
Dept: SURGERY | Age: 57
End: 2017-12-04

## 2017-12-04 VITALS — SYSTOLIC BLOOD PRESSURE: 135 MMHG | HEART RATE: 83 BPM | DIASTOLIC BLOOD PRESSURE: 76 MMHG

## 2017-12-04 DIAGNOSIS — E03.9 HYPOTHYROIDISM, UNSPECIFIED TYPE: Primary | ICD-10-CM

## 2017-12-04 PROCEDURE — G8428 CUR MEDS NOT DOCUMENT: HCPCS | Performed by: INTERNAL MEDICINE

## 2017-12-04 PROCEDURE — 3017F COLORECTAL CA SCREEN DOC REV: CPT | Performed by: INTERNAL MEDICINE

## 2017-12-04 PROCEDURE — G8421 BMI NOT CALCULATED: HCPCS | Performed by: INTERNAL MEDICINE

## 2017-12-04 PROCEDURE — G8484 FLU IMMUNIZE NO ADMIN: HCPCS | Performed by: INTERNAL MEDICINE

## 2017-12-04 PROCEDURE — 3014F SCREEN MAMMO DOC REV: CPT | Performed by: INTERNAL MEDICINE

## 2017-12-04 PROCEDURE — 99213 OFFICE O/P EST LOW 20 MIN: CPT | Performed by: INTERNAL MEDICINE

## 2017-12-04 PROCEDURE — 1036F TOBACCO NON-USER: CPT | Performed by: INTERNAL MEDICINE

## 2017-12-04 NOTE — PROGRESS NOTES
Rfl:     carvedilol (COREG) 6.25 MG tablet, Take 6.25 mg by mouth 2 times daily (with meals), Disp: , Rfl:     levothyroxine (SYNTHROID) 25 MCG tablet, 25 mcg by PEG Tube route Daily. , Disp: , Rfl:     MAGNESIUM OXIDE PO, 400 mg by PEG Tube route daily. , Disp: , Rfl:     cloZAPine (CLOZARIL) 25 MG tablet, 25 mg by PEG Tube route 2 times daily. Along with (1) 50 mg tab bid  (75 mg bid total), Disp: , Rfl:     cloZAPine (CLOZARIL) 50 MG tablet, 50 mg by PEG Tube route 2 times daily. Along with (1) 25 mg tab bid (75 mg bid total), Disp: , Rfl:       Review of Systems   Unable to perform ROS: Mental status change     Vitals:    12/04/17 1121   BP: 135/76   Site: Right Arm   Position: Sitting   Cuff Size: Medium Adult   Pulse: 83     In wheelchair     Objective:   Physical Exam   Constitutional: She appears well-developed and well-nourished. HENT:   Head: Normocephalic and atraumatic. Eyes: Conjunctivae are normal.   Neck: Neck supple. Cardiovascular: Normal rate. Pulmonary/Chest: Effort normal and breath sounds normal. She has no wheezes. She has no rales. Abdominal:   Obese    Musculoskeletal: She exhibits edema. Neurological: She is alert. Skin: Skin is warm and dry. Results for Rina Philippe (MRN [id number]) as of 12/4/2017 11:51   Ref. Range 11/21/2017 06:45   TSH Latest Ref Range: 0.270 - 4.200 uIU/mL 3.330   T4 Free Latest Ref Range: 0.93 - 1.70 ng/dL 1.15       Assessment:      1.  Hypothyroidism, unspecified type  T4, Free    TSH without Reflex            Plan:       Orders Placed This Encounter   Procedures    T4, Free     Standing Status:   Future     Standing Expiration Date:   12/4/2018    TSH without Reflex     Standing Status:   Future     Standing Expiration Date:   12/4/2018       continue synthroid 25 mcg daily   F/u in 6 months

## 2017-12-11 ENCOUNTER — OFFICE VISIT (OUTPATIENT)
Dept: INTERNAL MEDICINE | Age: 57
End: 2017-12-11

## 2017-12-11 VITALS
HEART RATE: 90 BPM | TEMPERATURE: 96.4 F | OXYGEN SATURATION: 95 % | DIASTOLIC BLOOD PRESSURE: 70 MMHG | SYSTOLIC BLOOD PRESSURE: 110 MMHG

## 2017-12-11 DIAGNOSIS — R63.5 WEIGHT GAIN: ICD-10-CM

## 2017-12-11 DIAGNOSIS — R13.12 OROPHARYNGEAL DYSPHAGIA: Primary | Chronic | ICD-10-CM

## 2017-12-11 PROBLEM — R32 URINARY INCONTINENCE: Status: ACTIVE | Noted: 2017-12-11

## 2017-12-11 PROBLEM — K02.9 CARIES: Status: ACTIVE | Noted: 2017-09-27

## 2017-12-11 PROCEDURE — G8428 CUR MEDS NOT DOCUMENT: HCPCS | Performed by: INTERNAL MEDICINE

## 2017-12-11 PROCEDURE — 3017F COLORECTAL CA SCREEN DOC REV: CPT | Performed by: INTERNAL MEDICINE

## 2017-12-11 PROCEDURE — G8484 FLU IMMUNIZE NO ADMIN: HCPCS | Performed by: INTERNAL MEDICINE

## 2017-12-11 PROCEDURE — 99213 OFFICE O/P EST LOW 20 MIN: CPT | Performed by: INTERNAL MEDICINE

## 2017-12-11 PROCEDURE — G8421 BMI NOT CALCULATED: HCPCS | Performed by: INTERNAL MEDICINE

## 2017-12-11 PROCEDURE — 3014F SCREEN MAMMO DOC REV: CPT | Performed by: INTERNAL MEDICINE

## 2017-12-11 PROCEDURE — 1036F TOBACCO NON-USER: CPT | Performed by: INTERNAL MEDICINE

## 2017-12-11 ASSESSMENT — ENCOUNTER SYMPTOMS
WHEEZING: 0
VOICE CHANGE: 0
ABDOMINAL DISTENTION: 0
SHORTNESS OF BREATH: 0
TROUBLE SWALLOWING: 1

## 2017-12-11 NOTE — PROGRESS NOTES
Date Value Ref Range Status    WBC 11/21/2017 5.4  4.8 - 10.8 K/uL Final    RBC 11/21/2017 4.34  4.20 - 5.40 M/uL Final    Hemoglobin 11/21/2017 14.3  12.0 - 16.0 g/dL Final    Hematocrit 11/21/2017 43.8  37.0 - 47.0 % Final    MCV 11/21/2017 100.9* 82.0 - 100.0 fL Final    MCH 11/21/2017 33.0* 27.0 - 31.3 pg Final    MCHC 11/21/2017 32.7* 33.0 - 37.0 % Final    RDW 11/21/2017 13.9  11.5 - 14.5 % Final    Platelets 29/17/1456 97* 130 - 400 K/uL Final    Neutrophils % 11/21/2017 50.1  % Final    Lymphocytes % 11/21/2017 33.6  % Final    Monocytes % 11/21/2017 12.2  % Final    Eosinophils % 11/21/2017 3.5  % Final    Basophils % 11/21/2017 0.6  % Final    Neutrophils # 11/21/2017 2.7  1.4 - 6.5 K/uL Final    Lymphocytes # 11/21/2017 1.8  1.0 - 4.8 K/uL Final    Monocytes # 11/21/2017 0.7  0.2 - 0.8 K/uL Final    Eosinophils # 11/21/2017 0.2  0.0 - 0.7 K/uL Final    Basophils # 11/21/2017 0.0  0.0 - 0.2 K/uL Final   Orders Only on 11/07/2017   Component Date Value Ref Range Status    TSH 11/07/2017 3.620  0.270 - 4.200 uIU/mL Final   Orders Only on 11/07/2017   Component Date Value Ref Range Status    T4 Free 11/07/2017 1.04  0.93 - 1.70 ng/dL Final   Orders Only on 11/07/2017   Component Date Value Ref Range Status    WBC 11/07/2017 5.5  4.8 - 10.8 K/uL Final    RBC 11/07/2017 3.79* 4.20 - 5.40 M/uL Final    Hemoglobin 11/07/2017 12.7  12.0 - 16.0 g/dL Final    Hematocrit 11/07/2017 38.1  37.0 - 47.0 % Final    MCV 11/07/2017 100.5* 82.0 - 100.0 fL Final    MCH 11/07/2017 33.4* 27.0 - 31.3 pg Final    MCHC 11/07/2017 33.3  33.0 - 37.0 % Final    RDW 11/07/2017 13.6  11.5 - 14.5 % Final    Platelets 62/60/7101 95* 130 - 400 K/uL Final    PLATELET SLIDE REVIEW 11/07/2017 Decreased   Final    Neutrophils % 11/07/2017 40.7  % Final    Lymphocytes % 11/07/2017 41.9  % Final    Monocytes % 11/07/2017 13.8  % Final    Eosinophils % 11/07/2017 3.1  % Final    Basophils % 11/07/2017 0.5  % dysphagia has been persistent and has not been progressive. She is unable to express complaints. She has not had gross gastrointestinal bleeding, with coffee ground emesis . This has been associated with no other symptoms. Caregiver does not give other details. More detail above in the chief complaint(s) and below in the review of systems.      Past Medical History:   Diagnosis Date    Bilateral edema of lower extremity     Bipolar affective disorder, current episode manic with psychotic symptoms ([facility] 8/19/2013    Depression     Diastolic heart failure, NYHA class 1 ([facility] 2015    2 D Echo, impaired relaxation, EF 50%    Dysphagia, oropharyngeal phase     Elevated diaphragm 2016    R side    History of Clostridium difficile colitis 1/2014    fecal transplant    Hyperlipidemia     Hypertension     Kidney congenitally absent, right     CT 2014    Left knee DJD 2013    severe    Localized, primary osteoarthritis of lower leg     Lower extremity pain, bilateral     Mental retardation, moderate (I.Q. 35-49)     Mixed sleep apnea     Morbid obesity with alveolar hypoventilation (HCC)     Other primary thrombocytopenia (HCC)     Other specified behavioral problem     PEG (percutaneous endoscopic gastrostomy) status ([facility] 2013    due to aspiration pneumonia (water&meds)    Pulmonary aspiration, history of     Sinus tachycardia     Solitary cyst of kidney 2014    Vitamin D deficiency        Past Surgical History:   Procedure Laterality Date    GASTROSTOMY TUBE CHANGE  04/08/15    GASTROSTOMY TUBE PLACEMENT  8/13/13    Dr Devon Burnett HISTORY  7/9/14    Change of G tube       Social History     Social History    Marital status: Single     Spouse name: N/A    Number of children: 0    Years of education: N/A     Occupational History    SSI      Social History Main Topics    Smoking status: Never Smoker    Smokeless tobacco: Never Used    Alcohol use No    Drug use: No    Sexual activity: Not Currently     Other Topics Concern    Not on file     Social History Narrative    Has some family, mother and sister in [place], they visit occasionally    POA APSI                   Family History   Problem Relation Age of Onset    Family history unknown: Yes       Family and social history were reviewed and pertinent changes documented. ALLERGIES    Review of patient's allergies indicates no known allergies. Current Outpatient Prescriptions on File Prior to Visit   Medication Sig Dispense Refill    OXYGEN Nocturnal, use as directed 1 Units 0    furosemide (LASIX) 20 MG tablet 1 tablet by PEG Tube route every morning 30 tablet 1    nystatin (MYCOSTATIN) 186729 UNIT/GM cream Apply topically 2 times daily. 30 g 1    valproate (DEPAKENE) 250 MG/5ML solution       Nutritional Supplements (JEVITY 1.2 KENA/FIBER) LIQD 240 mLs by PEG Tube route 4 times daily      Cholecalciferol (VITAMIN D) 400 UNIT/ML LIQD 1 mL by PEG Tube route every morning      potassium chloride 20 MEQ/15ML (10%) oral solution 20 mEq by Per G Tube route daily      carvedilol (COREG) 6.25 MG tablet Take 6.25 mg by mouth 2 times daily (with meals)      levothyroxine (SYNTHROID) 25 MCG tablet 25 mcg by PEG Tube route Daily.  MAGNESIUM OXIDE  mg by PEG Tube route daily.  cloZAPine (CLOZARIL) 25 MG tablet 25 mg by PEG Tube route 2 times daily. Along with (1) 50 mg tab bid  (75 mg bid total)      cloZAPine (CLOZARIL) 50 MG tablet 50 mg by PEG Tube route 2 times daily. Along with (1) 25 mg tab bid (75 mg bid total)       No current facility-administered medications on file prior to visit. Review of Systems   Unable to perform ROS: Psychiatric disorder   Constitutional: Negative for activity change, diaphoresis and fever. HENT: Positive for trouble swallowing. Negative for congestion, nosebleeds and voice change. Respiratory: Negative for shortness of breath and wheezing.     Cardiovascular: Positive for leg swelling. Gastrointestinal: Negative for abdominal distention. Endocrine: Negative for cold intolerance and heat intolerance. Genitourinary: Negative for hematuria. Skin: Negative for wound. Allergic/Immunologic: Negative for immunocompromised state. Neurological: Negative for syncope. Hematological: Does not bruise/bleed easily. Psychiatric/Behavioral: Negative for agitation and self-injury. Vitals:    12/11/17 1024   BP: 110/70   Site: Right Arm   Position: Sitting   Cuff Size: Medium Adult   Pulse: 90   Temp: 96.4 °F (35.8 °C)   TempSrc: Tympanic   SpO2: 95%       Physical Exam   Constitutional: No distress. Obese, age appropriate, well groomed  Chronically ill-appearing     HENT:   Head: Atraumatic. Eyes: Conjunctivae are normal.   Neck: Neck supple. No JVD present. No thyromegaly present. Cardiovascular: Regular rhythm and normal heart sounds. Exam reveals no gallop. There is  4+ bilateral leg edema below the knees    Pulmonary/Chest: Effort normal. No respiratory distress. She has no wheezes. She has no rales. Diminished breath sounds bilaterally due to poor ventilatory effort   The patient does not cooperate with lung auscultation, will not take a deep breath     Abdominal: Soft. There is no tenderness. Exam limited due to abdominal adiposity and position in the wheelchair   Peg in place      Musculoskeletal:   AFO to the right lower leg   Neurological: She is alert. She exhibits normal muscle tone. Moves all extremities with equal strength   Skin: Skin is warm. There is pallor. Psychiatric: Her mood appears not anxious. She is not hyperactive and not combative. Cognition and memory are impaired. She does not exhibit a depressed mood. She is noncommunicative. Makes eye contact, does not follow all  commands  She is inattentive. Assessment:    Mayda was seen today for dysphagia and weight gain.     Diagnoses and all orders for this visit:    Oropharyngeal dysphagia  Comments:  PEG status, pleasure foods bid  No evidence of aspiration, continue to monitor respiratory status    Weight gain              Monitor weights and cut back on calories, with aid of the dietitian  Leg edema increased possibly due to weight gain, limited mobility        Plan:    Reviewed with the patient (/and caregiver if present): current health status, medications, activities and diet. See also orders and comments in the assessment section. Today's diagnosis (in the context of chronic problems) was discussed with caregiver, questions answered. The current medical regimen is effective;  continue present plan and medications. Monthly orders for the residential were reviewed and signed in the interim. Close follow up needed to evaluate treatment results and for care coordination. Return in about 6 months (around 6/11/2018). I have reviewed the patient's medical and surgical, family and social history, health maintenance schedule, and updated the computerized patient record. Please note this report has been partially produced by using speech recognition hardware. It may contain errors related to the system, including grammar, punctuation and spelling as well as words and phrases that may seem inaccurate. For any questions or concerns, please feel free to contact me for clarification.         Electronically signed by Joesph Ascencio MD

## 2017-12-19 ENCOUNTER — OFFICE VISIT (OUTPATIENT)
Dept: PULMONOLOGY | Age: 57
End: 2017-12-19

## 2017-12-19 VITALS
WEIGHT: 172 LBS | SYSTOLIC BLOOD PRESSURE: 90 MMHG | DIASTOLIC BLOOD PRESSURE: 60 MMHG | BODY MASS INDEX: 27 KG/M2 | HEART RATE: 101 BPM | HEIGHT: 67 IN | TEMPERATURE: 97.2 F | OXYGEN SATURATION: 92 %

## 2017-12-19 DIAGNOSIS — R09.02 HYPOXEMIA: ICD-10-CM

## 2017-12-19 DIAGNOSIS — G47.39 MIXED SLEEP APNEA: Primary | ICD-10-CM

## 2017-12-19 PROCEDURE — 3017F COLORECTAL CA SCREEN DOC REV: CPT | Performed by: INTERNAL MEDICINE

## 2017-12-19 PROCEDURE — 3014F SCREEN MAMMO DOC REV: CPT | Performed by: INTERNAL MEDICINE

## 2017-12-19 PROCEDURE — 99213 OFFICE O/P EST LOW 20 MIN: CPT | Performed by: INTERNAL MEDICINE

## 2017-12-19 PROCEDURE — G8417 CALC BMI ABV UP PARAM F/U: HCPCS | Performed by: INTERNAL MEDICINE

## 2017-12-19 PROCEDURE — G8484 FLU IMMUNIZE NO ADMIN: HCPCS | Performed by: INTERNAL MEDICINE

## 2017-12-19 PROCEDURE — 1036F TOBACCO NON-USER: CPT | Performed by: INTERNAL MEDICINE

## 2017-12-19 PROCEDURE — G8427 DOCREV CUR MEDS BY ELIG CLIN: HCPCS | Performed by: INTERNAL MEDICINE

## 2017-12-19 ASSESSMENT — ENCOUNTER SYMPTOMS
DIARRHEA: 0
VOICE CHANGE: 0
NAUSEA: 0
SINUS PRESSURE: 0
COUGH: 0
EYE ITCHING: 0
SORE THROAT: 0
VOMITING: 0
EYE DISCHARGE: 0
TROUBLE SWALLOWING: 0
RHINORRHEA: 0
ABDOMINAL PAIN: 0
WHEEZING: 0
CHEST TIGHTNESS: 0
SHORTNESS OF BREATH: 0

## 2017-12-19 NOTE — PROGRESS NOTES
Subjective:     Avis Arteaga is a 61 y.o. female who complains today of:     Chief Complaint   Patient presents with    Follow-up     hypoxemia       HPI  She is from group home, using 2 lit O2 with sleep  She has no complaint shortness of breath  No cough, No sputum  No wheezing  No chest pain or pleuritic pain  No fever or chills   No hemoptysis  No Nausea, Vomiting or Diarrhea      Allergies:  Review of patient's allergies indicates no known allergies.   Past Medical History:   Diagnosis Date    Bilateral edema of lower extremity     Bipolar affective disorder, current episode manic with psychotic symptoms ([facility] [address] 8/19/2013    Depression     Diastolic heart failure, NYHA class 1 ([facility] [address] [facility] 2015    2 D Echo, impaired relaxation, EF 50%    Dysphagia, oropharyngeal phase     Elevated diaphragm 2016    R side    History of Clostridium difficile colitis 1/2014    fecal transplant    Hyperlipidemia     Hypertension     Kidney congenitally absent, right     CT 2014    Left knee DJD 2013    severe    Localized, primary osteoarthritis of lower leg     Lower extremity pain, bilateral     Mental retardation, moderate (I.Q. 35-49)     Mixed sleep apnea     Morbid obesity with alveolar hypoventilation (HCC)     Other primary thrombocytopenia (HCC)     Other specified behavioral problem     PEG (percutaneous endoscopic gastrostomy) status ([facility] [facility] 2013    due to aspiration pneumonia (water&meds)    Pulmonary aspiration, history of     Sinus tachycardia     Solitary cyst of kidney 2014    Vitamin D deficiency      Past Surgical History:   Procedure Laterality Date    GASTROSTOMY TUBE CHANGE  04/08/15    GASTROSTOMY TUBE PLACEMENT  8/13/13    Dr Devon Grullon HISTORY  7/9/14    Change of G tube     Family History   Problem Relation Age of Onset    Family history unknown: Yes     Social History     Social History    Marital status: Single     Spouse name: N/A    Number of children: 0    Years of placed during the hospital encounter of 08/30/17   XR CHEST STANDARD TWO VW    Narrative EXAMINATION: XR CHEST STANDARD TWO VIEWS:     DATE AND TIME: 8/30/2017 at 1:08 PM.     CLINICAL HISTORY: SHORTNESS OF BREATH. COUGH. COMPARISONS: 12/17/2016. FINDINGS: Pleural-parenchymal changes at the right base consistent with probable atelectasis and a high right hemidiaphragm. Minimal atelectasis or scarring at the left base. Allowing for slight differences in positioning and inspiratory effort, there   probably has been no significant change. Impression CHRONIC PLEURAL-PARENCHYMAL CHANGES IN THE RIGHT LOWER LUNG ZONE. Assessment/Plan:     1. Mixed sleep apnea  Patient unable to use a CPAP therapy. Using 2 L O2 with sleep. Continue O2 as before. Keep O2 saturation 93% or above    2. Hypoxemia  Patient is on 2 L O2 with sleep. No murmurs saturation is 87% improved to 92% at rest she had a chest x-ray done shows chronic pleural-parenchymal changes at the right lower lung zone. Return in about 1 year (around 12/19/2018) for hypoxia on O2.       Julianne Zamora MD

## 2018-01-10 DIAGNOSIS — N30.00 ACUTE CYSTITIS WITHOUT HEMATURIA: Primary | ICD-10-CM

## 2018-01-12 DIAGNOSIS — A49.8 PROTEUS MIRABILIS INFECTION: Primary | ICD-10-CM

## 2018-01-12 RX ORDER — SULFAMETHOXAZOLE AND TRIMETHOPRIM 800; 160 MG/1; MG/1
1 TABLET ORAL 2 TIMES DAILY
Qty: 14 TABLET | Refills: 0 | Status: SHIPPED | OUTPATIENT
Start: 2018-01-12 | End: 2018-01-19

## 2018-02-07 LAB
AMORPHOUS: NORMAL
BACTERIA: NORMAL /HPF
BILIRUBIN URINE: NEGATIVE
BLOOD, URINE: NEGATIVE
CLARITY: ABNORMAL
COLOR: YELLOW
CRYSTALS, UA: NORMAL
EPITHELIAL CELLS, UA: NORMAL /HPF
GLUCOSE URINE: NEGATIVE MG/DL
KETONES, URINE: NEGATIVE MG/DL
LEUKOCYTE ESTERASE, URINE: ABNORMAL
NITRITE, URINE: NEGATIVE
PH UA: 8.5 (ref 5–9)
PROTEIN UA: ABNORMAL MG/DL
RBC UA: NORMAL /HPF (ref 0–2)
RENAL EPITHELIAL, UA: NORMAL /HPF
SPECIFIC GRAVITY UA: 1.01 (ref 1–1.03)
UROBILINOGEN, URINE: 1 E.U./DL
WBC UA: NORMAL /HPF (ref 0–5)

## 2018-02-15 DIAGNOSIS — B95.2 ENTEROCOCCUS UTI: Primary | ICD-10-CM

## 2018-02-15 DIAGNOSIS — N39.0 ENTEROCOCCUS UTI: Primary | ICD-10-CM

## 2018-02-15 LAB
MISCELLANEOUS LAB TEST ORDER: NORMAL
WHOPPER PROMPT: NORMAL

## 2018-02-15 RX ORDER — DOXYCYCLINE 100 MG/1
100 TABLET ORAL 2 TIMES DAILY
Qty: 20 TABLET | Refills: 0 | Status: SHIPPED | OUTPATIENT
Start: 2018-02-15 | End: 2018-02-16

## 2018-02-16 LAB
ORGANISM: ABNORMAL
URINE CULTURE, ROUTINE: ABNORMAL
URINE CULTURE, ROUTINE: ABNORMAL

## 2018-02-16 RX ORDER — NITROFURANTOIN 25; 75 MG/1; MG/1
100 CAPSULE ORAL 2 TIMES DAILY
Qty: 14 CAPSULE | Refills: 0 | Status: SHIPPED | OUTPATIENT
Start: 2018-02-16 | End: 2018-02-23

## 2018-03-15 LAB
AMORPHOUS: ABNORMAL
BACTERIA: ABNORMAL /HPF
BILIRUBIN URINE: NEGATIVE
BLOOD, URINE: ABNORMAL
CLARITY: ABNORMAL
COLOR: YELLOW
CRYSTALS, UA: ABNORMAL
EPITHELIAL CELLS, UA: ABNORMAL /HPF
GLUCOSE URINE: NEGATIVE MG/DL
KETONES, URINE: NEGATIVE MG/DL
LEUKOCYTE ESTERASE, URINE: ABNORMAL
NITRITE, URINE: NEGATIVE
PH UA: 8.5 (ref 5–9)
PROTEIN UA: NEGATIVE MG/DL
RBC UA: ABNORMAL /HPF (ref 0–2)
RENAL EPITHELIAL, UA: ABNORMAL /HPF
SPECIFIC GRAVITY UA: 1.01 (ref 1–1.03)
UROBILINOGEN, URINE: 0.2 E.U./DL
WBC UA: ABNORMAL /HPF (ref 0–5)

## 2018-03-16 LAB — URINE CULTURE, ROUTINE: NORMAL

## 2018-03-26 DIAGNOSIS — Z20.828 EXPOSURE TO INFLUENZA: Primary | ICD-10-CM

## 2018-03-26 RX ORDER — OSELTAMIVIR PHOSPHATE 75 MG/1
75 CAPSULE ORAL DAILY
Qty: 10 CAPSULE | Refills: 0 | Status: SHIPPED | OUTPATIENT
Start: 2018-03-26 | End: 2018-04-05

## 2018-04-23 RX ORDER — OLANZAPINE 2.5 MG/1
2.5 TABLET ORAL NIGHTLY
COMMUNITY
Start: 2018-03-11 | End: 2018-07-03 | Stop reason: SDUPTHER

## 2018-04-25 ENCOUNTER — OFFICE VISIT (OUTPATIENT)
Dept: CARDIOLOGY CLINIC | Age: 58
End: 2018-04-25
Payer: MEDICARE

## 2018-04-25 VITALS
RESPIRATION RATE: 22 BRPM | HEART RATE: 86 BPM | SYSTOLIC BLOOD PRESSURE: 110 MMHG | OXYGEN SATURATION: 93 % | TEMPERATURE: 97.5 F | DIASTOLIC BLOOD PRESSURE: 76 MMHG

## 2018-04-25 DIAGNOSIS — R00.0 SINUS TACHYCARDIA: ICD-10-CM

## 2018-04-25 DIAGNOSIS — F71 MENTAL RETARDATION, MODERATE (I.Q. 35-49): ICD-10-CM

## 2018-04-25 DIAGNOSIS — I10 ESSENTIAL HYPERTENSION: Primary | ICD-10-CM

## 2018-04-25 DIAGNOSIS — I50.30 DIASTOLIC HEART FAILURE, NYHA CLASS 1 (HCC): ICD-10-CM

## 2018-04-25 PROCEDURE — 99213 OFFICE O/P EST LOW 20 MIN: CPT | Performed by: INTERNAL MEDICINE

## 2018-04-25 PROCEDURE — G8427 DOCREV CUR MEDS BY ELIG CLIN: HCPCS | Performed by: INTERNAL MEDICINE

## 2018-04-25 PROCEDURE — 1036F TOBACCO NON-USER: CPT | Performed by: INTERNAL MEDICINE

## 2018-04-25 PROCEDURE — 3017F COLORECTAL CA SCREEN DOC REV: CPT | Performed by: INTERNAL MEDICINE

## 2018-04-25 PROCEDURE — G8417 CALC BMI ABV UP PARAM F/U: HCPCS | Performed by: INTERNAL MEDICINE

## 2018-04-25 ASSESSMENT — ENCOUNTER SYMPTOMS
CHEST TIGHTNESS: 0
APNEA: 0
VOMITING: 0
SHORTNESS OF BREATH: 0
NAUSEA: 0

## 2018-05-18 ENCOUNTER — TELEPHONE (OUTPATIENT)
Dept: SURGERY | Age: 58
End: 2018-05-18

## 2018-05-18 DIAGNOSIS — R13.10 PROBLEMS WITH SWALLOWING AND MASTICATION: ICD-10-CM

## 2018-05-18 DIAGNOSIS — K94.23 MECHANICAL COMPLICATION OF GASTROSTOMY (HCC): Primary | ICD-10-CM

## 2018-05-25 ENCOUNTER — HOSPITAL ENCOUNTER (OUTPATIENT)
Dept: INFUSION THERAPY | Age: 58
Setting detail: INFUSION SERIES
Discharge: HOME | End: 2018-05-25
Payer: MEDICARE

## 2018-05-25 PROCEDURE — 43760 CHANGE GASTROSTOMY TUBE PERCUTANEOUS W/O GUIDE: CPT | Performed by: SURGERY

## 2018-05-25 PROCEDURE — 43760 HC CHANGE OF GASTROSTOMY TUBE: CPT

## 2018-05-25 NOTE — PROGRESS NOTES
Dr ANAYA [facility] AT [facility] removed PEG and replaced  Tolerated well   Pt left with staff from their facility  All equipment used in the care for this patient has been cleaned.

## 2018-06-04 ENCOUNTER — OFFICE VISIT (OUTPATIENT)
Dept: INTERNAL MEDICINE CLINIC | Age: 58
End: 2018-06-04
Payer: MEDICARE

## 2018-06-04 VITALS
TEMPERATURE: 98.3 F | SYSTOLIC BLOOD PRESSURE: 120 MMHG | HEART RATE: 91 BPM | DIASTOLIC BLOOD PRESSURE: 80 MMHG | OXYGEN SATURATION: 97 %

## 2018-06-04 DIAGNOSIS — E03.9 ACQUIRED HYPOTHYROIDISM: Primary | ICD-10-CM

## 2018-06-04 DIAGNOSIS — Z12.11 COLON CANCER SCREENING: ICD-10-CM

## 2018-06-04 PROCEDURE — 99213 OFFICE O/P EST LOW 20 MIN: CPT | Performed by: INTERNAL MEDICINE

## 2018-06-04 PROCEDURE — 82274 ASSAY TEST FOR BLOOD FECAL: CPT | Performed by: INTERNAL MEDICINE

## 2018-06-04 PROCEDURE — G8417 CALC BMI ABV UP PARAM F/U: HCPCS | Performed by: INTERNAL MEDICINE

## 2018-06-04 PROCEDURE — G8427 DOCREV CUR MEDS BY ELIG CLIN: HCPCS | Performed by: INTERNAL MEDICINE

## 2018-06-04 PROCEDURE — 1036F TOBACCO NON-USER: CPT | Performed by: INTERNAL MEDICINE

## 2018-06-04 PROCEDURE — 3017F COLORECTAL CA SCREEN DOC REV: CPT | Performed by: INTERNAL MEDICINE

## 2018-06-04 ASSESSMENT — ENCOUNTER SYMPTOMS
SHORTNESS OF BREATH: 0
VOICE CHANGE: 0
WHEEZING: 0
TROUBLE SWALLOWING: 1
ABDOMINAL DISTENTION: 0

## 2018-06-04 ASSESSMENT — PATIENT HEALTH QUESTIONNAIRE - PHQ9
SUM OF ALL RESPONSES TO PHQ9 QUESTIONS 1 & 2: 0
SUM OF ALL RESPONSES TO PHQ QUESTIONS 1-9: 0
2. FEELING DOWN, DEPRESSED OR HOPELESS: 0
1. LITTLE INTEREST OR PLEASURE IN DOING THINGS: 0

## 2018-06-12 ENCOUNTER — APPOINTMENT (OUTPATIENT)
Dept: ULTRASOUND IMAGING | Age: 58
End: 2018-06-12
Payer: MEDICARE

## 2018-06-12 ENCOUNTER — HOSPITAL ENCOUNTER (EMERGENCY)
Age: 58
Discharge: HOME | End: 2018-06-12
Attending: EMERGENCY MEDICINE
Payer: MEDICARE

## 2018-06-12 VITALS
SYSTOLIC BLOOD PRESSURE: 143 MMHG | WEIGHT: 180 LBS | OXYGEN SATURATION: 97 % | TEMPERATURE: 98 F | HEART RATE: 91 BPM | RESPIRATION RATE: 18 BRPM | BODY MASS INDEX: 28.19 KG/M2 | DIASTOLIC BLOOD PRESSURE: 66 MMHG

## 2018-06-12 DIAGNOSIS — M79.89 LEG SWELLING: Primary | ICD-10-CM

## 2018-06-12 LAB
ALBUMIN SERPL-MCNC: 3.3 G/DL (ref 3.9–4.9)
ALP BLD-CCNC: 98 U/L (ref 40–130)
ALT SERPL-CCNC: 12 U/L (ref 0–33)
ANION GAP SERPL CALCULATED.3IONS-SCNC: 15 MEQ/L (ref 7–13)
ANION GAP SERPL CALCULATED.3IONS-SCNC: 7 MEQ/L (ref 7–13)
APTT: 25.2 SEC (ref 21.6–35.4)
AST SERPL-CCNC: 29 U/L (ref 0–35)
BASOPHILS ABSOLUTE: 0 K/UL (ref 0–0.2)
BASOPHILS ABSOLUTE: 0 K/UL (ref 0–0.2)
BASOPHILS RELATIVE PERCENT: 0.6 %
BASOPHILS RELATIVE PERCENT: 0.6 %
BILIRUB SERPL-MCNC: 0.3 MG/DL (ref 0–1.2)
BUN BLDV-MCNC: 20 MG/DL (ref 6–20)
BUN BLDV-MCNC: 20 MG/DL (ref 6–20)
CALCIUM SERPL-MCNC: 9 MG/DL (ref 8.6–10.2)
CALCIUM SERPL-MCNC: 9.3 MG/DL (ref 8.6–10.2)
CHLORIDE BLD-SCNC: 89 MEQ/L (ref 98–107)
CHLORIDE BLD-SCNC: 90 MEQ/L (ref 98–107)
CO2: 29 MEQ/L (ref 22–29)
CO2: 32 MEQ/L (ref 22–29)
CREAT SERPL-MCNC: 0.69 MG/DL (ref 0.5–0.9)
CREAT SERPL-MCNC: 0.73 MG/DL (ref 0.5–0.9)
EOSINOPHILS ABSOLUTE: 0.1 K/UL (ref 0–0.7)
EOSINOPHILS ABSOLUTE: 0.2 K/UL (ref 0–0.7)
EOSINOPHILS RELATIVE PERCENT: 0.8 %
EOSINOPHILS RELATIVE PERCENT: 2.7 %
GFR AFRICAN AMERICAN: >60
GFR AFRICAN AMERICAN: >60
GFR NON-AFRICAN AMERICAN: >60
GFR NON-AFRICAN AMERICAN: >60
GLOBULIN: 3.6 G/DL (ref 2.3–3.5)
GLUCOSE BLD-MCNC: 261 MG/DL (ref 74–109)
GLUCOSE BLD-MCNC: 62 MG/DL (ref 74–109)
HCT VFR BLD CALC: 43.5 % (ref 37–47)
HCT VFR BLD CALC: 43.7 % (ref 37–47)
HEMOGLOBIN: 14.6 G/DL (ref 12–16)
HEMOGLOBIN: 15 G/DL (ref 12–16)
INR BLD: 1.1
LYMPHOCYTES ABSOLUTE: 1.1 K/UL (ref 1–4.8)
LYMPHOCYTES ABSOLUTE: 2.2 K/UL (ref 1–4.8)
LYMPHOCYTES RELATIVE PERCENT: 13.5 %
LYMPHOCYTES RELATIVE PERCENT: 33.7 %
MCH RBC QN AUTO: 33.5 PG (ref 27–31.3)
MCH RBC QN AUTO: 34.2 PG (ref 27–31.3)
MCHC RBC AUTO-ENTMCNC: 33.4 % (ref 33–37)
MCHC RBC AUTO-ENTMCNC: 34.4 % (ref 33–37)
MCV RBC AUTO: 100.3 FL (ref 82–100)
MCV RBC AUTO: 99.2 FL (ref 82–100)
MONOCYTES ABSOLUTE: 0.8 K/UL (ref 0.2–0.8)
MONOCYTES ABSOLUTE: 0.8 K/UL (ref 0.2–0.8)
MONOCYTES RELATIVE PERCENT: 10 %
MONOCYTES RELATIVE PERCENT: 11.5 %
NEUTROPHILS ABSOLUTE: 3.4 K/UL (ref 1.4–6.5)
NEUTROPHILS ABSOLUTE: 5.9 K/UL (ref 1.4–6.5)
NEUTROPHILS RELATIVE PERCENT: 51.5 %
NEUTROPHILS RELATIVE PERCENT: 75.1 %
PDW BLD-RTO: 13.3 % (ref 11.5–14.5)
PDW BLD-RTO: 13.8 % (ref 11.5–14.5)
PLATELET # BLD: 139 K/UL (ref 130–400)
PLATELET # BLD: 68 K/UL (ref 130–400)
PLATELET SLIDE REVIEW: ABNORMAL
PLATELET SLIDE REVIEW: ADEQUATE
POTASSIUM SERPL-SCNC: 4.8 MEQ/L (ref 3.5–5.1)
POTASSIUM SERPL-SCNC: 5 MEQ/L (ref 3.5–5.1)
PROTHROMBIN TIME: 11.3 SEC (ref 9.6–12.3)
RBC # BLD: 4.36 M/UL (ref 4.2–5.4)
RBC # BLD: 4.39 M/UL (ref 4.2–5.4)
SODIUM BLD-SCNC: 128 MEQ/L (ref 132–144)
SODIUM BLD-SCNC: 134 MEQ/L (ref 132–144)
T4 FREE: 1.17 NG/DL (ref 0.93–1.7)
TOTAL PROTEIN: 6.9 G/DL (ref 6.4–8.1)
TSH SERPL DL<=0.05 MIU/L-ACNC: 3.78 UIU/ML (ref 0.27–4.2)
WBC # BLD: 6.5 K/UL (ref 4.8–10.8)
WBC # BLD: 7.9 K/UL (ref 4.8–10.8)

## 2018-06-12 PROCEDURE — 99284 EMERGENCY DEPT VISIT MOD MDM: CPT

## 2018-06-12 PROCEDURE — 93971 EXTREMITY STUDY: CPT

## 2018-06-12 PROCEDURE — 80053 COMPREHEN METABOLIC PANEL: CPT

## 2018-06-12 PROCEDURE — 36415 COLL VENOUS BLD VENIPUNCTURE: CPT

## 2018-06-12 PROCEDURE — 85025 COMPLETE CBC W/AUTO DIFF WBC: CPT

## 2018-06-12 PROCEDURE — 85730 THROMBOPLASTIN TIME PARTIAL: CPT

## 2018-06-12 PROCEDURE — 85610 PROTHROMBIN TIME: CPT

## 2018-06-12 RX ORDER — FUROSEMIDE 40 MG/5ML
20 SOLUTION ORAL DAILY
Qty: 15 ML | Refills: 0 | Status: SHIPPED | OUTPATIENT
Start: 2018-06-12 | End: 2018-06-14 | Stop reason: ALTCHOICE

## 2018-06-12 ASSESSMENT — ENCOUNTER SYMPTOMS
NAUSEA: 0
ABDOMINAL PAIN: 0
BACK PAIN: 0
VOMITING: 0
DIARRHEA: 0
COUGH: 0
SORE THROAT: 0
SHORTNESS OF BREATH: 0

## 2018-06-14 ENCOUNTER — OFFICE VISIT (OUTPATIENT)
Dept: ENDOCRINOLOGY | Age: 58
End: 2018-06-14
Payer: MEDICARE

## 2018-06-14 VITALS
SYSTOLIC BLOOD PRESSURE: 108 MMHG | HEART RATE: 80 BPM | DIASTOLIC BLOOD PRESSURE: 70 MMHG | WEIGHT: 195 LBS | BODY MASS INDEX: 30.54 KG/M2

## 2018-06-14 DIAGNOSIS — R73.9 HYPERGLYCEMIA: ICD-10-CM

## 2018-06-14 DIAGNOSIS — E03.9 HYPOTHYROIDISM, UNSPECIFIED TYPE: Primary | ICD-10-CM

## 2018-06-14 DIAGNOSIS — R60.0 LOCALIZED EDEMA: ICD-10-CM

## 2018-06-14 LAB
GLUCOSE BLD-MCNC: 77 MG/DL
HBA1C MFR BLD: 5.4 %

## 2018-06-14 PROCEDURE — G8417 CALC BMI ABV UP PARAM F/U: HCPCS | Performed by: INTERNAL MEDICINE

## 2018-06-14 PROCEDURE — 3017F COLORECTAL CA SCREEN DOC REV: CPT | Performed by: INTERNAL MEDICINE

## 2018-06-14 PROCEDURE — 99213 OFFICE O/P EST LOW 20 MIN: CPT | Performed by: INTERNAL MEDICINE

## 2018-06-14 PROCEDURE — 83036 HEMOGLOBIN GLYCOSYLATED A1C: CPT | Performed by: INTERNAL MEDICINE

## 2018-06-14 PROCEDURE — G8427 DOCREV CUR MEDS BY ELIG CLIN: HCPCS | Performed by: INTERNAL MEDICINE

## 2018-06-14 PROCEDURE — 1036F TOBACCO NON-USER: CPT | Performed by: INTERNAL MEDICINE

## 2018-06-14 PROCEDURE — 82962 GLUCOSE BLOOD TEST: CPT | Performed by: INTERNAL MEDICINE

## 2018-06-14 RX ORDER — FUROSEMIDE 20 MG/1
20 TABLET ORAL DAILY
COMMUNITY
End: 2018-12-13 | Stop reason: SDUPTHER

## 2018-06-15 ENCOUNTER — OFFICE VISIT (OUTPATIENT)
Dept: INTERNAL MEDICINE CLINIC | Age: 58
End: 2018-06-15
Payer: MEDICARE

## 2018-06-15 VITALS
SYSTOLIC BLOOD PRESSURE: 100 MMHG | OXYGEN SATURATION: 95 % | TEMPERATURE: 97.2 F | DIASTOLIC BLOOD PRESSURE: 70 MMHG | HEART RATE: 80 BPM

## 2018-06-15 DIAGNOSIS — I89.0 ACQUIRED LYMPHEDEMA OF LEG: Primary | Chronic | ICD-10-CM

## 2018-06-15 PROCEDURE — 1036F TOBACCO NON-USER: CPT | Performed by: INTERNAL MEDICINE

## 2018-06-15 PROCEDURE — G8417 CALC BMI ABV UP PARAM F/U: HCPCS | Performed by: INTERNAL MEDICINE

## 2018-06-15 PROCEDURE — 99213 OFFICE O/P EST LOW 20 MIN: CPT | Performed by: INTERNAL MEDICINE

## 2018-06-15 PROCEDURE — 3017F COLORECTAL CA SCREEN DOC REV: CPT | Performed by: INTERNAL MEDICINE

## 2018-06-15 PROCEDURE — G8427 DOCREV CUR MEDS BY ELIG CLIN: HCPCS | Performed by: INTERNAL MEDICINE

## 2018-06-15 RX ORDER — INFANT FORMULA, IRON/DHA/ARA 2.32 G/1
POWDER (GRAM) ORAL
Qty: 1000 ML | Refills: 1
Start: 2018-06-15

## 2018-06-29 DIAGNOSIS — I89.0 ACQUIRED LYMPHEDEMA OF LEG: Primary | ICD-10-CM

## 2018-07-03 RX ORDER — CLOZAPINE 25 MG/1
25 TABLET ORAL 2 TIMES DAILY
Qty: 60 TABLET | Refills: 3
Start: 2018-07-03 | End: 2018-11-24 | Stop reason: ALTCHOICE

## 2018-07-03 RX ORDER — OLANZAPINE 5 MG/1
5 TABLET ORAL NIGHTLY
Qty: 30 TABLET | Refills: 3
Start: 2018-07-03 | End: 2019-05-07 | Stop reason: CLARIF

## 2018-07-11 ENCOUNTER — HOSPITAL ENCOUNTER (OUTPATIENT)
Dept: PHYSICAL THERAPY | Age: 58
Setting detail: THERAPIES SERIES
Discharge: HOME | End: 2018-07-11
Payer: MEDICARE

## 2018-07-11 PROCEDURE — 97110 THERAPEUTIC EXERCISES: CPT

## 2018-07-11 PROCEDURE — 97163 PT EVAL HIGH COMPLEX 45 MIN: CPT

## 2018-07-11 PROCEDURE — G8978 MOBILITY CURRENT STATUS: HCPCS

## 2018-07-11 PROCEDURE — G8979 MOBILITY GOAL STATUS: HCPCS

## 2018-07-11 ASSESSMENT — PAIN SCALES - GENERAL: PAINLEVEL_OUTOF10: 0

## 2018-07-11 NOTE — PLAN OF CARE
home lymphedema management. Prognosis: Good    New Education Provided: ther ex, caregiver on lymphedema options   G-Codes  PT G-Codes  Functional Assessment Tool Used: AMPAC and clinical judgment   Score: 25  Functional Limitation: Mobility: Walking and moving around  Mobility: Walking and Moving Around Current Status (): At least 60 percent but less than 80 percent impaired, limited or restricted  Mobility: Walking and Moving Around Goal Status (): At least 40 percent but less than 60 percent impaired, limited or restricted    PLAN: [x] Evaluate and Treat  Frequency/Duration:  Plan  Times per week: 1  Plan weeks: 3-4  Current Treatment Recommendations: Strengthening, ROM, Balance Training, Functional Mobility Training, Transfer Training, Manual Therapy - Soft Tissue Mobilization, Manual Therapy - Joint Manipulation, Manual Lymphatic Drainage, Home Exercise Program, Safety Education & Training, Patient/Caregiver Education & Training, Equipment Evaluation, Education, & procurement     Precautions: falls             ? Patient Status:[x] Continue/ Initiate plan of Care    [] Discharge PT. Recommend pt continue with HEP. [] Additional visits requested, Please re-certify for additional visits:      Signature: Electronically signed by Nati Verdin PT on 7/11/18 at 1:02 PM    If you have any questions or concerns, please don't hesitate to call. Thank you for your referral.    I have reviewed this plan of care and certify a need for medically necessary rehabilitation services.     Physician Signature:__________________________________________________________  Date:  Please sign and return

## 2018-07-11 NOTE — PROGRESS NOTES
[facility]    [x]  [address]  []  [facility]        of [address]      Date: 2018  Patient: Mayer Lombard  : 1960  ACCT #: [de-identified]  Referring physician: Referring Practitioner: Dr. Marina Holden     Referring Practitioner: Dr. Marina Holden     Referral Date : 18    Diagnosis: acquired lymphedema of leg     Treatment Diagnosis: LE lymphedema, LE weakness, difficulty with gait   Onset Date:  (1 1/2 mos ago )  PT Insurance Information: medicare, medicaid   Total # of Visits Approved:  (medical necessity )   Total # of Visits to Date: 1  No Show: 0  Canceled Appointment: 0    History   has a past medical history of Bilateral edema of lower extremity; Bipolar affective disorder, current episode manic with psychotic symptoms ([address] Depression; Diastolic heart failure, NYHA class 1 ([address] Dysphagia, oropharyngeal phase; Elevated diaphragm; History of Clostridium difficile colitis; Kidney congenitally absent, right; Left knee DJD; Localized, primary osteoarthritis of lower leg; Lower extremity pain, bilateral; Mental retardation, moderate (I.Q. 35-49); Mixed sleep apnea; Morbid obesity with alveolar hypoventilation ([address] Nocturnal hypoxemia due to obesity; Other primary thrombocytopenia ([address] Other specified behavioral problem; PEG (percutaneous endoscopic gastrostomy) status ([address] Pulmonary aspiration, history of; S/P colonoscopy; Sinus tachycardia; Solitary cyst of kidney; and Vitamin D deficiency. has a past surgical history that includes Gastrostomy tube placement (13); other surgical history (14); and gastrostomy tube change (04/08/15). Subjective  Subjective: Caregiver reports gradual onset of swelling in bilateral legs. States pt c/o intermittent pain in her legs. (-) DVT testing. Caregiver denies cardiac testing. Caregiver reports pt was ambulating independently prior to 2016. Pt became ill with limited mobility. Pt now able to ambulate up to 50' with ww with PT, but primarily in w/c. Denies warmth or redness in LEs. 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  Cellulitis): []No     []Yes:  Comment(s):    History of Cancer:       No [x]      Yes []    Location:  Date cancer diagnosed:   History of cancer treatment:     Chemotherapy     Radiation     Lymph node removal       Surgery: Comment(s):  Prior treatment for Lymphedema:None    Does patient currently participate in daily exercise:   [x]  No  []  Yes:    Side effects from cancer treatment: (include location when appropriate)  []   Pain:  []  Fatigue   []  Neuropathy:  []  Difficulty with ADLs   []  Assist needed   []  Change from previous functional status  []  Lymphedema  []  Limited ROM   []  Skin irritation/ burns:  []  Falls/ imbalance:  []  Difficulty with speech/ swallow:  []  Weight loss/ gain:     Previous Lymphedema Treatment:   [x] no  [] yes:  []compressive bandaging   [] lymphatic drainage massage  [] pump   results:    OBJECTIVE     AROM RLE (degrees)  RLE General AROM: limited by excess soft tissue      AROM LLE (degrees)  LLE General AROM: limited by excess soft tissue      AROM RUE (degrees)  RUE AROM : WFL     AROM LUE (degrees)  LUE AROM : WFL  Spine  Cervical:

## 2018-08-06 ENCOUNTER — HOSPITAL ENCOUNTER (OUTPATIENT)
Dept: PHYSICAL THERAPY | Age: 58
Setting detail: THERAPIES SERIES
Discharge: HOME | End: 2018-08-06
Payer: MEDICARE

## 2018-08-06 DIAGNOSIS — Z12.39 BREAST CANCER SCREENING: Primary | ICD-10-CM

## 2018-08-06 PROCEDURE — 97110 THERAPEUTIC EXERCISES: CPT

## 2018-08-06 PROCEDURE — 97140 MANUAL THERAPY 1/> REGIONS: CPT

## 2018-08-06 NOTE — PROGRESS NOTES
massage    Goals:  Short term goals  Time Frame for Short term goals: 2 wks   Short term goal 1: Cargiver independent with HEP     Long term goals  Time Frame for Long term goals : 3 wks   Long term goal 1: Needed equipment ordered for pt to assist with lymphedema management   Long term goal 2: Caregivers independent with good understanding of massage   Progress toward goals:STG 1, LTG 2    POST-PAIN       Pain Rating (0-10 pain scale):  0 /10   Location and pain description same as pre-treatment unless indicated. Action: [] NA   [] Perform HEP  [] Meds as prescribed  [] Modalities as prescribed   [] Call Physician     Frequency/Duration:  Plan  Times per week: 1  Plan weeks: 3-4  Current Treatment Recommendations: Strengthening, ROM, Balance Training, Functional Mobility Training, Transfer Training, Manual Therapy - Soft Tissue Mobilization, Manual Therapy - Joint Manipulation, Manual Lymphatic Drainage, Home Exercise Program, Safety Education & Training, Patient/Caregiver Education & Training, Equipment Evaluation, Education, & procurement     Pt to continue current HEP. See objective section for any therapeutic exercise changes, additions or modifications this date.          PT Individual Minutes  Time In: 2288  Time Out: 1492  Minutes: 41     TE x10  Manual x 31    Signature:  Electronically signed by Orville Roberson PTA on 8/6/18 at 9:55 AM

## 2018-08-17 ENCOUNTER — HOSPITAL ENCOUNTER (OUTPATIENT)
Dept: PHYSICAL THERAPY | Age: 58
Setting detail: THERAPIES SERIES
Discharge: HOME | End: 2018-08-17
Payer: MEDICARE

## 2018-08-17 PROCEDURE — 97110 THERAPEUTIC EXERCISES: CPT

## 2018-08-17 PROCEDURE — 97140 MANUAL THERAPY 1/> REGIONS: CPT

## 2018-08-17 NOTE — PROGRESS NOTES
[id number] [address]  Outpatient Physical Therapy    Treatment Note        Date: 2018  Patient: Mars Boggs  : 1960  ACCT #: [de-identified]  Referring Practitioner: Dr. Narinder Grimaldo   Diagnosis: acquired lymphedema of leg     Visit Information:  PT Visit Information  Onset Date:  (1 1/2 mos ago )  PT Insurance Information: medicare, medicaid   Total # of Visits Approved:  (medical necessity )  Total # of Visits to Date: 3  Plan of Care/Certification Expiration Date: 18  No Show: 0  Canceled Appointment: 0  Progress Note Counter: 3/10     Subjective: Caregiver reports she doesn't know if pt is receiving ex's and massage, pt not always cooperative. HEP Compliance:  [x] Good [x] Fair [] Poor [] Reports not doing due to:    Vital Signs  Patient Currently in Pain: No (Pt unable to state)   Pain Screening  Patient Currently in Pain: No (Pt unable to state)    OBJECTIVE:   Exercises  Exercise 1: cervical flex, ext, rot x5 ea with physical assist to complete   Exercise 2: trunk flexion reaching to toes x10  Exercise 4: UE flexion x10  Exercise 5: UE abduction [id number] assist , with trunk SB  Exercise 6:   Exercise 7: abd/ add [id number] assist   Exercise 8: ankle pumps x10 with assist   Exercise 9: Lymphedema massage performed by therapist           Location Right (cm)  Left (cm)  Rt   Lt     ! st DIP 9 9 9 9   5 cm from 1st toe 27 25 24 24   10 cm from 1st toe  29 29 27.5 29   5 cm from heel  31.5 29.5 30.5 29   10 cm  32.5 43.5 30.5 31   20 40.5 53 38 51   30 52.5 54 51 55   40 54 56 56.5 58   50 59 61 59.5 62            *Indicates exercise, modality, or manual techniques to be initiated when appropriate    Assessment: Body structures, Functions, Activity limitations: Decreased functional mobility , Decreased ROM, Decreased strength  Assessment: Pt tolerated session well with min A for ex's and therapist performed massage.  A new caregiver was present and instructed in

## 2018-08-20 ENCOUNTER — APPOINTMENT (OUTPATIENT)
Dept: PHYSICAL THERAPY | Age: 58
End: 2018-08-20
Payer: MEDICARE

## 2018-08-21 ENCOUNTER — HOSPITAL ENCOUNTER (OUTPATIENT)
Dept: PHYSICAL THERAPY | Age: 58
Setting detail: THERAPIES SERIES
Discharge: HOME | End: 2018-08-21
Payer: MEDICARE

## 2018-08-21 PROCEDURE — 97140 MANUAL THERAPY 1/> REGIONS: CPT

## 2018-08-21 PROCEDURE — 97110 THERAPEUTIC EXERCISES: CPT

## 2018-08-21 NOTE — PROGRESS NOTES
[address] [address]  Outpatient Physical Therapy    Treatment Note        Date: 2018  Patient: Mayra Perez  : 1960  ACCT #: [de-identified]  Referring Practitioner: Dr. Nelda Patel   Diagnosis: acquired lymphedema of leg     Visit Information:  PT Visit Information  Onset Date:  (1 1/2 mos ago )  PT Insurance Information: medicare, medicaid   Total # of Visits Approved:  (medical necessity )  Total # of Visits to Date: 4  Plan of Care/Certification Expiration Date: 18  No Show: 0  Canceled Appointment: 0  Progress Note Counter: 4/10     Subjective: Caregiver reports aides are trying to perform ex's 1st and 2nd shift and massage 2nd an d3rd shift, pt not always cooperative with massage     HEP Compliance:  [] Good [x] Fair [] Poor [] Reports not doing due to:    Vital Signs  Patient Currently in Pain: No (Pt unable to state)   Pain Screening  Patient Currently in Pain: No (Pt unable to state)    OBJECTIVE:   Exercises  Exercise 1: cervical flex, ext, rot x10 ea with physical assist to complete   Exercise 2: trunk flexion reaching to toes x10  Exercise 4: UE flexion x10  Exercise 5: UE abduction [id number] assist , with trunk SB  Exercise 6: march x10  Exercise 7: abd/ add [id number] assist   Exercise 8: ankle pumps x10 with assist   Exercise 9: Lymphedema massage performed by therapist       *Indicates exercise, modality, or manual techniques to be initiated when appropriate    Assessment: Body structures, Functions, Activity limitations: Decreased functional mobility , Decreased ROM, Decreased strength  Assessment: Caregiver present and staes comfortable with massage. Caregiver states pt going to  tomorrow, discussed with caregiver to discuss compression pump with MD and  will schedule one more appt in 2 weeks to follow up with therapist regarding pump and staff comfort with massage .   Treatment Diagnosis: LE lymphedema, LE weakness, difficulty with gait           Goals:  Short term goals  Time Frame for Short term goals: 2 wks   Short term goal 1: Cargiver independent with HEP     Long term goals  Time Frame for Long term goals : 3 wks   Long term goal 1: Needed equipment ordered for pt to assist with lymphedema management   Long term goal 2: Caregivers independent with good understanding of massage   Progress toward goals:STG 1,LTG 2    POST-PAIN       Pain Rating (0-10 pain scale):   0/10   Location and pain description same as pre-treatment unless indicated. Action: [] NA   [] Perform HEP  [] Meds as prescribed  [] Modalities as prescribed   [] Call Physician     Frequency/Duration:  Plan  Times per week: 1  Plan weeks: 3-4  Current Treatment Recommendations: Strengthening, ROM, Balance Training, Functional Mobility Training, Transfer Training, Manual Therapy - Soft Tissue Mobilization, Manual Therapy - Joint Manipulation, Manual Lymphatic Drainage, Home Exercise Program, Safety Education & Training, Patient/Caregiver Education & Training, Equipment Evaluation, Education, & procurement     Pt to continue current HEP. See objective section for any therapeutic exercise changes, additions or modifications this date.          PT Individual Minutes  Time In: 0902  Time Out: 0940  Minutes: 38     Manual x 28  TE x 10    Signature:  Electronically signed by Tomeka Barfield PTA on 8/21/18 at 7:58 AM

## 2018-08-22 ENCOUNTER — OFFICE VISIT (OUTPATIENT)
Dept: INTERNAL MEDICINE CLINIC | Age: 58
End: 2018-08-22
Payer: MEDICARE

## 2018-08-22 VITALS
DIASTOLIC BLOOD PRESSURE: 70 MMHG | HEART RATE: 86 BPM | SYSTOLIC BLOOD PRESSURE: 92 MMHG | BODY MASS INDEX: 30.54 KG/M2 | WEIGHT: 195 LBS | OXYGEN SATURATION: 95 % | TEMPERATURE: 97.2 F

## 2018-08-22 DIAGNOSIS — G47.36 NOCTURNAL HYPOXEMIA DUE TO OBESITY: ICD-10-CM

## 2018-08-22 DIAGNOSIS — I89.0 LYMPHEDEMA OF BOTH LOWER EXTREMITIES: Primary | ICD-10-CM

## 2018-08-22 DIAGNOSIS — E66.9 NOCTURNAL HYPOXEMIA DUE TO OBESITY: ICD-10-CM

## 2018-08-22 LAB
CONTROL: NORMAL
HEMOCCULT STL QL: NEGATIVE

## 2018-08-22 PROCEDURE — 99213 OFFICE O/P EST LOW 20 MIN: CPT | Performed by: INTERNAL MEDICINE

## 2018-08-22 PROCEDURE — 1036F TOBACCO NON-USER: CPT | Performed by: INTERNAL MEDICINE

## 2018-08-22 PROCEDURE — G8427 DOCREV CUR MEDS BY ELIG CLIN: HCPCS | Performed by: INTERNAL MEDICINE

## 2018-08-22 PROCEDURE — G8417 CALC BMI ABV UP PARAM F/U: HCPCS | Performed by: INTERNAL MEDICINE

## 2018-08-22 PROCEDURE — 3017F COLORECTAL CA SCREEN DOC REV: CPT | Performed by: INTERNAL MEDICINE

## 2018-08-22 ASSESSMENT — ENCOUNTER SYMPTOMS
TROUBLE SWALLOWING: 1
COUGH: 0
SHORTNESS OF BREATH: 1
ABDOMINAL DISTENTION: 0

## 2018-08-22 NOTE — PROGRESS NOTES
Emmanuel Masters is a 61 y.o. female with significant intellectual disability, obesity, dysphagia, psychotic illness, who presents with     Chief Complaint   Patient presents with    Leg Swelling     the patient has started therapy in the lymphedema clinic, with slight improvement, she requires a lymphedema pump for nocturnal use       Interim history: The patient is brought to the office visit today in the wheelchair, by the caregiver at the group home. Since the past office visit with me, has not been in the emergency room or hospital.  She is followed by a neuropsychiatrist.  No new medications. She has been taking furosemide and potassium mainly for her cardiomyopathy, with no significant relief of leg edema.      The following laboratory reports since the past visit were reviewed (the ones pertinent to today's visit were discussed with the patient):    Orders Only on 08/21/2018   Component Date Value Ref Range Status    WBC 08/21/2018 4.7* 4.8 - 10.8 K/uL Final    RBC 08/21/2018 3.87* 4.20 - 5.40 M/uL Final    Hemoglobin 08/21/2018 13.3  12.0 - 16.0 g/dL Final    Hematocrit 08/21/2018 39.1  37.0 - 47.0 % Final    MCV 08/21/2018 101.1* 82.0 - 100.0 fL Final    MCH 08/21/2018 34.5* 27.0 - 31.3 pg Final    MCHC 08/21/2018 34.1  33.0 - 37.0 % Final    RDW 08/21/2018 13.8  11.5 - 14.5 % Final    Platelets 63/64/2709 63* 130 - 400 K/uL Final    see previous plts, plt slide reviews; new plt slide review to follow    PLATELET SLIDE REVIEW 08/21/2018 Decreased   Final    Neutrophils % 08/21/2018 36.5  % Final    Lymphocytes % 08/21/2018 45.3  % Final    Monocytes % 08/21/2018 15.1  % Final    Eosinophils % 08/21/2018 2.7  % Final    Basophils % 08/21/2018 0.4  % Final    Neutrophils # 08/21/2018 1.7  1.4 - 6.5 K/uL Final    Lymphocytes # 08/21/2018 2.1  1.0 - 4.8 K/uL Final    Monocytes # 08/21/2018 0.7  0.2 - 0.8 K/uL Final    Eosinophils # 08/21/2018 0.1  0.0 - 0.7 K/uL Final    Basophils # 08/21/2018 0.0  0.0 - 0.2 K/uL Final   Orders Only on 08/07/2018   Component Date Value Ref Range Status    WBC 08/07/2018 7.2  4.8 - 10.8 K/uL Final    RBC 08/07/2018 4.41  4.20 - 5.40 M/uL Final    Hemoglobin 08/07/2018 14.9  12.0 - 16.0 g/dL Final    Hematocrit 08/07/2018 44.1  37.0 - 47.0 % Final    MCV 08/07/2018 99.9  82.0 - 100.0 fL Final    MCH 08/07/2018 33.8* 27.0 - 31.3 pg Final    MCHC 08/07/2018 33.8  33.0 - 37.0 % Final    RDW 08/07/2018 13.9  11.5 - 14.5 % Final    Platelets 18/26/6006 93* 130 - 400 K/uL Corrected    Corrected result; previously reported as 81 on 08/07/2018 at 17:48 by EMILY    PLATELET SLIDE REVIEW 08/07/2018 Decreased   Final    SLIDE REVIEW 08/07/2018 see below   Final    Slide review agrees with reported results    Neutrophils % 08/07/2018 50.1  % Final    Lymphocytes % 08/07/2018 35.7  % Final    Monocytes % 08/07/2018 12.5  % Final    Eosinophils % 08/07/2018 1.2  % Final    Basophils % 08/07/2018 0.5  % Final    Neutrophils # 08/07/2018 3.6  1.4 - 6.5 K/uL Final    Lymphocytes # 08/07/2018 2.6  1.0 - 4.8 K/uL Final    Monocytes # 08/07/2018 0.9* 0.2 - 0.8 K/uL Final    Eosinophils # 08/07/2018 0.1  0.0 - 0.7 K/uL Final    Basophils # 08/07/2018 0.0  0.0 - 0.2 K/uL Final    RBC Morphology 08/07/2018 Normal   Final   Orders Only on 07/24/2018   Component Date Value Ref Range Status    WBC 07/24/2018 5.1  4.8 - 10.8 K/uL Final    RBC 07/24/2018 4.22  4.20 - 5.40 M/uL Final    Hemoglobin 07/24/2018 14.2  12.0 - 16.0 g/dL Final    Hematocrit 07/24/2018 42.6  37.0 - 47.0 % Final    MCV 07/24/2018 100.8* 82.0 - 100.0 fL Final    MCH 07/24/2018 33.6* 27.0 - 31.3 pg Final    MCHC 07/24/2018 33.4  33.0 - 37.0 % Final    RDW 07/24/2018 14.2  11.5 - 14.5 % Final    Platelets 80/25/6842 69* 130 - 400 K/uL Final    ee previous; plt slide review to follow    PLATELET SLIDE REVIEW 07/24/2018 Decreased   Final    Neutrophils % 07/24/2018 42.0  % Final    4.20 - 5.40 M/uL Final    Hemoglobin 06/12/2018 15.0  12.0 - 16.0 g/dL Final    Hematocrit 06/12/2018 43.5  37.0 - 47.0 % Final    MCV 06/12/2018 99.2  82.0 - 100.0 fL Final    MCH 06/12/2018 34.2* 27.0 - 31.3 pg Final    MCHC 06/12/2018 34.4  33.0 - 37.0 % Final    RDW 06/12/2018 13.8  11.5 - 14.5 % Final    Platelets 60/17/1085 139  130 - 400 K/uL Final    PLATELET SLIDE REVIEW 06/12/2018 Adequate   Final    Neutrophils % 06/12/2018 51.5  % Final    Lymphocytes % 06/12/2018 33.7  % Final    Monocytes % 06/12/2018 11.5  % Final    Eosinophils % 06/12/2018 2.7  % Final    Basophils % 06/12/2018 0.6  % Final    Neutrophils # 06/12/2018 3.4  1.4 - 6.5 K/uL Final    Lymphocytes # 06/12/2018 2.2  1.0 - 4.8 K/uL Final    Monocytes # 06/12/2018 0.8  0.2 - 0.8 K/uL Final    Eosinophils # 06/12/2018 0.2  0.0 - 0.7 K/uL Final    Basophils # 06/12/2018 0.0  0.0 - 0.2 K/uL Final   Orders Only on 06/12/2018   Component Date Value Ref Range Status    Sodium 06/12/2018 134  132 - 144 mEq/L Final    Potassium 06/12/2018 4.8  3.5 - 5.1 mEq/L Final    Comment: Specimen hemolysis has exceeded the interference as defined  by Roche. Value may be falsely increased. Suggest  recollection if clinically indicated.  Chloride 06/12/2018 90* 98 - 107 mEq/L Final    CO2 06/12/2018 29  22 - 29 mEq/L Final    Anion Gap 06/12/2018 15* 7 - 13 mEq/L Final    Glucose 06/12/2018 62* 74 - 109 mg/dL Final    BUN 06/12/2018 20  6 - 20 mg/dL Final    CREATININE 06/12/2018 0.69  0.50 - 0.90 mg/dL Final    GFR Non- 06/12/2018 >60.0  >60 Final    Comment: >60 mL/min/1.73m2 EGFR, calc. for ages 25 and older using the  MDRD formula (not corrected for weight), is valid for stable  renal function.  GFR  06/12/2018 >60.0  >60 Final    Comment: >60 mL/min/1.73m2 EGFR, calc. for ages 25 and older using the  MDRD formula (not corrected for weight), is valid for stable  renal function.       Calcium 06/12/2018 9.3  8.6 - 10.2 mg/dL Final   Orders Only on 06/12/2018   Component Date Value Ref Range Status    TSH 06/12/2018 3.780  0.270 - 4.200 uIU/mL Final   There may be more visits with results that are not included. HPI:    HPI     Edema  Patient comes for evaluation of edema in both legs, the left being worse. The edema has been severe. Onset of symptoms was several months ago, and patient reports symptoms have gradually worsened since that time. The edema is present all day. The patient states the problem is long-standing. The swelling has been aggravated by dependency of involved area and hot weather. The swelling has been relieved by diuretics, support stockings, elevation of involved area, PT. Associated factors include: orthopnea/PND and obesity. Cardiac risk factors include obesity (BMI >= 30 kg/m2) and sedentary lifestyle. More detail above in the chief complaint(s), interim history and below in the review of systems.      Past Medical History:   Diagnosis Date    Bilateral edema of lower extremity     Bipolar affective disorder, current episode manic with psychotic symptoms ([address] 08/19/2013    Dr Reginald Connor Diastolic heart failure, NYHA class 1 ([address] 2015    2 D Echo, impaired relaxation, EF 50%    Dysphagia, oropharyngeal phase     Elevated diaphragm 2016    R side    History of Clostridium difficile colitis 1/2014    fecal transplant    Kidney congenitally absent, right     CT 2014    Left knee DJD 2013    severe, Dr Felicita Arechiga    Localized, primary osteoarthritis of lower leg     Lower extremity pain, bilateral     Mental retardation, moderate (I.Q. 35-49)     Mixed sleep apnea     Morbid obesity with alveolar hypoventilation (HCC)     Nocturnal hypoxemia due to obesity     Other primary thrombocytopenia ([address]     Other specified behavioral problem     PEG (percutaneous endoscopic gastrostomy) status ([address] 2013    due to aspiration pneumonia ([facility])    Pulmonary aspiration, history of     S/P colonoscopy 2011    normal    Sinus tachycardia     Solitary cyst of kidney 2014    Vitamin D deficiency        Past Surgical History:   Procedure Laterality Date    GASTROSTOMY TUBE CHANGE  04/08/15    GASTROSTOMY TUBE PLACEMENT  8/13/13    Dr [employer] [employer] HISTORY  7/9/14    Change of G tube       Social History     Social History    Marital status: Single     Spouse name: N/A    Number of children: 0    Years of education: N/A     Occupational History    SSI      Social History Main Topics    Smoking status: Never Smoker    Smokeless tobacco: Never Used    Alcohol use No    Drug use: No    Sexual activity: Not Currently     Other Topics Concern    Not on file     Social History Narrative    Has some family, mother and sister in [place], they visit occasionally    POA APSI                   Family History   Problem Relation Age of Onset    Family history unknown: Yes       Family and social history were reviewed and pertinent changes documented. ALLERGIES    Patient has no known allergies. Current Outpatient Prescriptions on File Prior to Visit   Medication Sig Dispense Refill    cloZAPine (CLOZARIL) 25 MG tablet 1 tablet by PEG Tube route 2 times daily 60 tablet 3    OLANZapine (ZYPREXA) 5 MG tablet 1 tablet by PEG Tube route nightly 30 tablet 3    Nutritional Supplements (JEVITY 1.2 KENA/FIBER) LIQD Give 3 cans per peg daily 1000 mL 1    furosemide (LASIX) 20 MG tablet 20 mg by PEG Tube route daily      OXYGEN Nocturnal, use as directed 1 Units 0    nystatin (MYCOSTATIN) 134847 UNIT/GM cream Apply topically 2 times daily.  30 g 1    valproate (DEPAKENE) 250 MG/5ML solution 250 mg by Per G Tube route every morning Along with 750 mg via G tube bid @ 4 pm & 8 pm      Cholecalciferol (VITAMIN D) 400 UNIT/ML LIQD 1 mL by PEG Tube route every morning      potassium chloride 20 MEQ/15ML (10%) oral solution 20 mEq by Per G Tube route daily      carvedilol (COREG) 6.25 MG tablet 6.25 mg by PEG Tube route 2 times daily (with meals)       levothyroxine (SYNTHROID) 25 MCG tablet 25 mcg by PEG Tube route Daily.  MAGNESIUM OXIDE  mg by PEG Tube route daily. No current facility-administered medications on file prior to visit. Review of Systems   Unable to perform ROS: Psychiatric disorder   Constitutional: Negative for unexpected weight change. HENT: Positive for trouble swallowing. Negative for nosebleeds. Respiratory: Positive for shortness of breath (exertional, nocturnal). Negative for cough. Gastrointestinal: Negative for abdominal distention. Endocrine: Negative for cold intolerance and heat intolerance. Genitourinary: Negative for decreased urine volume. Musculoskeletal: Positive for gait problem. Skin: Negative for rash and wound. Neurological: Negative for syncope. Psychiatric/Behavioral: Negative for agitation and self-injury. Vitals:    08/22/18 0931   BP: 92/70   Site: Right Arm   Position: Sitting   Cuff Size: Medium Adult   Pulse: 86   Temp: 97.2 °F (36.2 °C)   TempSrc: Tympanic   SpO2: 95%   Weight: 195 lb (88.5 kg)       Physical Exam   Constitutional: No distress. Obese, age appropriate, well groomed  Chronically ill-appearing  In a wheelchair     HENT:   Head: Atraumatic. Eyes: Conjunctivae are normal.   Neck: Neck supple. No JVD present. Cardiovascular: Regular rhythm. Exam reveals no gallop. There is  4+ bilateral leg edema below the knees, left leg being worse, with distal leg erythema, not due to cellulitis    Pulmonary/Chest: Effort normal. No respiratory distress. She has no rales. Diminished breath sounds bilaterally due to poor ventilatory effort   The patient does not cooperate with lung auscultation, will not take a deep breath     Abdominal: Soft. She exhibits no distension.    Exam limited due to abdominal adiposity and position in the wheelchair Peg in place      Musculoskeletal:   AFO on the right leg only, left foot in hyperflexion   Neurological: She is alert. She exhibits normal muscle tone. Moves all extremities with equal strength   Skin: Skin is warm. There is pallor. Psychiatric: Her mood appears anxious. She is not agitated, not withdrawn and not combative. Cognition and memory are impaired. She does not exhibit a depressed mood. She is noncommunicative. Makes eye contact  Attempts at verbal communication She is inattentive. Assessment:    Mayda was seen today for leg swelling. Diagnoses and all orders for this visit:    Lymphedema of both lower extremities               Severe, no significant relief from diuretics. She'll benefit from lymphedema compression therapy. Follow weights closely with dietitian's input, patient needs to lose weight. Nocturnal hypoxemia due to obesity                        Plan:    Reviewed with the patient (/and caregiver if present): current health status, medications, activities and diet. See also orders and comments in the assessment section. Today's diagnosis (in the context of chronic problems) was discussed with patient (/and caregiver if present), questions answered. The current medical regimen is effective;  continue present plan and medications. .Monthly orders for the halfway were reviewed and signed in the interim. Close follow up needed to evaluate treatment results and for care coordination. Return in about 4 months (around 12/22/2018). I have reviewed the patient's medical and surgical, family and social history, health maintenance schedule, and updated the computerized patient record. Please note this report has been partially produced by using speech recognition hardware. It may contain errors related to the system, including grammar, punctuation and spelling as well as words and phrases that may seem inaccurate.  For any questions or concerns, please feel free to contact me for clarification.         Electronically signed by Faby Domínguez MD

## 2018-08-27 LAB
ANION GAP SERPL CALCULATED.3IONS-SCNC: 10 MEQ/L (ref 7–13)
BUN BLDV-MCNC: 19 MG/DL (ref 6–20)
CALCIUM SERPL-MCNC: 8.8 MG/DL (ref 8.6–10.2)
CHLORIDE BLD-SCNC: 97 MEQ/L (ref 98–107)
CO2: 35 MEQ/L (ref 22–29)
CREAT SERPL-MCNC: 0.8 MG/DL (ref 0.5–0.9)
GFR AFRICAN AMERICAN: >60
GFR NON-AFRICAN AMERICAN: >60
GLUCOSE BLD-MCNC: 56 MG/DL (ref 74–109)
POTASSIUM SERPL-SCNC: 4.4 MEQ/L (ref 3.5–5.1)
SODIUM BLD-SCNC: 142 MEQ/L (ref 132–144)
T4 FREE: 1.08 NG/DL (ref 0.93–1.7)
TSH SERPL DL<=0.05 MIU/L-ACNC: 3.58 UIU/ML (ref 0.27–4.2)

## 2018-09-06 ENCOUNTER — HOSPITAL ENCOUNTER (OUTPATIENT)
Dept: WOMENS IMAGING | Age: 58
Discharge: HOME | End: 2018-09-08
Payer: MEDICARE

## 2018-09-06 DIAGNOSIS — Z12.39 BREAST CANCER SCREENING: ICD-10-CM

## 2018-09-06 PROCEDURE — 77067 SCR MAMMO BI INCL CAD: CPT

## 2018-09-10 ENCOUNTER — HOSPITAL ENCOUNTER (OUTPATIENT)
Dept: PHYSICAL THERAPY | Age: 58
Setting detail: THERAPIES SERIES
Discharge: HOME | End: 2018-09-10
Payer: MEDICARE

## 2018-09-10 RX ORDER — AMOXICILLIN 250 MG
1 CAPSULE ORAL DAILY
Qty: 30 TABLET | Refills: 3 | Status: SHIPPED | OUTPATIENT
Start: 2018-09-10 | End: 2018-10-12 | Stop reason: SDUPTHER

## 2018-09-10 RX ORDER — BISACODYL 10 MG
10 SUPPOSITORY, RECTAL RECTAL DAILY PRN
Qty: 20 SUPPOSITORY | Refills: 0 | Status: SHIPPED | OUTPATIENT
Start: 2018-09-10 | End: 2019-06-19 | Stop reason: SDUPTHER

## 2018-09-10 NOTE — PROGRESS NOTES
Called PCP office to ask for clarification for use of pump. Spoke with Jessica SINGH who consulted with Dr. Valorie Beaulieu. Advised pt is cleared to trial pump. Will trial 1 leg at a time to ensure not overloading heart.       Electronically signed by Enzo Sandhu PT on 9/10/2018 at 4:40 PM

## 2018-09-24 ENCOUNTER — HOSPITAL ENCOUNTER (EMERGENCY)
Age: 58
Discharge: HOME | End: 2018-09-24
Attending: EMERGENCY MEDICINE
Payer: MEDICARE

## 2018-09-24 ENCOUNTER — APPOINTMENT (OUTPATIENT)
Dept: CT IMAGING | Age: 58
End: 2018-09-24
Payer: MEDICARE

## 2018-09-24 VITALS
WEIGHT: 200 LBS | HEART RATE: 85 BPM | SYSTOLIC BLOOD PRESSURE: 147 MMHG | HEIGHT: 63 IN | OXYGEN SATURATION: 93 % | BODY MASS INDEX: 35.44 KG/M2 | TEMPERATURE: 96.8 F | RESPIRATION RATE: 20 BRPM | DIASTOLIC BLOOD PRESSURE: 50 MMHG

## 2018-09-24 DIAGNOSIS — T79.6XXA TRAUMATIC RHABDOMYOLYSIS, INITIAL ENCOUNTER (HCC): ICD-10-CM
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
## 2018-09-24 LAB
ACANTHOCYTES: 0
ALBUMIN SERPL-MCNC: 3.4 G/DL (ref 3.9–4.9)
ALP BLD-CCNC: 84 U/L (ref 40–130)
ALT SERPL-CCNC: 12 U/L (ref 0–33)
ANION GAP SERPL CALCULATED.3IONS-SCNC: 9 MEQ/L (ref 7–13)
ANISOCYTOSIS: 0
AST SERPL-CCNC: 48 U/L (ref 0–35)
AUER RODS: 0
BANDED NEUTROPHILS RELATIVE PERCENT: 3 % (ref 5–11)
BASOPHILIC STIPPLING: 0
BASOPHILS ABSOLUTE: 0 K/UL (ref 0–0.2)
BASOPHILS RELATIVE PERCENT: 0.5 %
BILIRUB SERPL-MCNC: 0.3 MG/DL (ref 0–1.2)
BUN BLDV-MCNC: 14 MG/DL (ref 6–20)
BURR CELLS: 0
CABOT RINGS: 0
CALCIUM SERPL-MCNC: 9.2 MG/DL (ref 8.6–10.2)
CHLORIDE BLD-SCNC: 92 MEQ/L (ref 98–107)
CK MB: 18.7 NG/ML (ref 0–3.8)
CO2: 32 MEQ/L (ref 22–29)
CREAT SERPL-MCNC: 0.8 MG/DL (ref 0.5–0.9)
CREATINE KINASE-MB INDEX: 2.2 % (ref 0–3.5)
DOHLE BODIES: 0
EOSINOPHILS ABSOLUTE: 0 K/UL (ref 0–0.7)
EOSINOPHILS RELATIVE PERCENT: 1.3 %
GFR AFRICAN AMERICAN: >60
GFR NON-AFRICAN AMERICAN: >60
GLOBULIN: 3.7 G/DL (ref 2.3–3.5)
GLUCOSE BLD-MCNC: 101 MG/DL (ref 74–109)
HAIRY CELLS: 0
HCT VFR BLD CALC: 43.2 % (ref 37–47)
HEMOGLOBIN: 14.9 G/DL (ref 12–16)
HOWELL-JOLLY BODIES: 0
HYPERSEGMENTED NEUTROPHILS: 0
HYPOCHROMIA: 0
LYMPHOCYTES ABSOLUTE: 2.1 K/UL (ref 1–4.8)
LYMPHOCYTES RELATIVE PERCENT: 19 %
MACROCYTES: 0
MCH RBC QN AUTO: 34.7 PG (ref 27–31.3)
MCHC RBC AUTO-ENTMCNC: 34.6 % (ref 33–37)
MCV RBC AUTO: 100.2 FL (ref 82–100)
METAMYELOCYTES RELATIVE PERCENT: 1 %
MICROCYTES: 0
MONOCYTES ABSOLUTE: 0.9 K/UL (ref 0.2–0.8)
MONOCYTES RELATIVE PERCENT: 7.6 %
NEUTROPHILS ABSOLUTE: 8.1 K/UL (ref 1.4–6.5)
NEUTROPHILS RELATIVE PERCENT: 70 %
OVALOCYTES: 0
PAPPENHEIMER BODIES: 0
PDW BLD-RTO: 13.4 % (ref 11.5–14.5)
PELGER HUET CELLS: 0 %
PLATELET # BLD: 109 K/UL (ref 130–400)
PLATELET SLIDE REVIEW: ABNORMAL
POIKILOCYTES: 0
POLYCHROMASIA: 0
POTASSIUM SERPL-SCNC: 5 MEQ/L (ref 3.5–5.1)
RBC # BLD: 4.31 M/UL (ref 4.2–5.4)
RBC # BLD: NORMAL 10*6/UL
SCHISTOCYTES: 0
SICKLE CELLS: 0
SMUDGE CELLS: 9.4
SODIUM BLD-SCNC: 133 MEQ/L (ref 132–144)
SPHEROCYTES: 0
STOMATOCYTES: 0
TARGET CELLS: 0
TEAR DROP CELLS: 0
TOTAL CK: 832 U/L (ref 0–170)
TOTAL PROTEIN: 7.1 G/DL (ref 6.4–8.1)
TOXIC GRANULATION: 0
VACUOLATED NEUTROPHILS: 0
WBC # BLD: 10.9 K/UL (ref 4.8–10.8)

## 2018-09-24 PROCEDURE — 80053 COMPREHEN METABOLIC PANEL: CPT

## 2018-09-24 PROCEDURE — 82550 ASSAY OF CK (CPK): CPT

## 2018-09-24 PROCEDURE — 85025 COMPLETE CBC W/AUTO DIFF WBC: CPT

## 2018-09-24 PROCEDURE — 99285 EMERGENCY DEPT VISIT HI MDM: CPT

## 2018-09-24 PROCEDURE — 70450 CT HEAD/BRAIN W/O DYE: CPT

## 2018-09-24 PROCEDURE — 82553 CREATINE MB FRACTION: CPT

## 2018-09-24 PROCEDURE — 36415 COLL VENOUS BLD VENIPUNCTURE: CPT

## 2018-09-24 NOTE — ED PROVIDER NOTES
[address] [facility] ED  eMERGENCY dEPARTMENT eNCOUnter      Pt Name: Robi Ayala  MRN: [id number]  [place] 1960  Date of evaluation: 9/24/2018  Provider: Abdiel Lamb DO    CHIEF COMPLAINT       Chief Complaint   Patient presents with    Fall     Unwitnessed fall from group home. Pt found face down on tile floor. HISTORY OF PRESENT ILLNESS   (Location/Symptom, Timing/Onset, Context/Setting, Quality, Duration, Modifying Factors, Severity)  Note limiting factors. Robi Ayala is a 61 y.o. female who presents to the emergency department . This patient with history of MRDD and bipolar affective disorder was brought in after being found face down on the ground at the group home. Patient is not ambulatory and uses a wheelchair. It is unclear how she fell. They wanted her checked for injuries. She is acting at her baseline. HPI    Nursing Notes were reviewed. REVIEW OF SYSTEMS    (2-9 systems for level 4, 10 or more for level 5)     Review of Systems   Unable to perform ROS: Psychiatric disorder   Endocrine: Negative for polyuria. Except as noted above the remainder of the review of systems was reviewed and negative.        PAST MEDICAL HISTORY     Past Medical History:   Diagnosis Date    Bipolar affective disorder, current episode manic with psychotic symptoms ([address] 08/19/2013    Dr Rebecca Mcwilliams Depression     Diastolic heart failure, NYHA class 1 ([address] 2015    2 D Echo, impaired relaxation, EF 50%    Dysphagia, oropharyngeal phase     Elevated diaphragm 2016    R side    History of Clostridium difficile colitis 1/2014    fecal transplant    Kidney congenitally absent, right     CT 2014    Left knee DJD 2013    severe, Dr Sheng Henning    Localized, primary osteoarthritis of lower leg     Lower extremity pain, bilateral     Lymphedema of both lower extremities     lymphedema tarda    Mental retardation, moderate (I.Q. 35-49)     Mixed sleep apnea     Morbid obesity with alveolar hypoventilation (HCC)     Nocturnal hypoxemia due to obesity     Other primary thrombocytopenia (HCC)     Other specified behavioral problem     PEG (percutaneous endoscopic gastrostomy) [facility] ([facility] 2013    due to aspiration pneumonia (water&meds)    Pulmonary aspiration, history of     S/P colonoscopy 2011    normal    Sinus tachycardia     Solitary cyst of kidney 2014    Vitamin D deficiency          SURGICAL HISTORY       Past Surgical History:   Procedure Laterality Date    GASTROSTOMY TUBE CHANGE  04/08/15    GASTROSTOMY TUBE PLACEMENT  8/13/13    Dr [facility] HISTORY  7/9/14    Change of G tube         CURRENT MEDICATIONS       Previous Medications    BISACODYL (DULCOLAX) 10 MG SUPPOSITORY    Place 1 suppository rectally daily as needed for Constipation    CARVEDILOL (COREG) 6.25 MG TABLET    6.25 mg by PEG Tube route 2 times daily (with meals)     CHOLECALCIFEROL (VITAMIN D) 400 UNIT/ML LIQD    1 mL by PEG Tube route every morning    CLOZAPINE (CLOZARIL) 25 MG TABLET    1 tablet by PEG Tube route 2 times daily    FUROSEMIDE (LASIX) 20 MG TABLET    20 mg by PEG Tube route daily    LEVOTHYROXINE (SYNTHROID) 25 MCG TABLET    25 mcg by PEG Tube route Daily. MAGNESIUM OXIDE PO    400 mg by PEG Tube route daily. NUTRITIONAL SUPPLEMENTS (JEVITY 1.2 KENA/FIBER) LIQD    Give 3 cans per peg daily    NYSTATIN (MYCOSTATIN) 762843 UNIT/GM CREAM    Apply topically 2 times daily. OLANZAPINE (ZYPREXA) 5 MG TABLET    1 tablet by PEG Tube route nightly    OXYGEN    Nocturnal, use as directed    POTASSIUM CHLORIDE 20 MEQ/15ML (10%) ORAL SOLUTION    20 mEq by Per G Tube route daily    SENNA-DOCUSATE (PERICOLACE) 8.6-50 MG PER TABLET    Take 1 tablet by mouth daily    VALPROATE (DEPAKENE) 250 MG/5ML SOLUTION    250 mg by Per G Tube route every morning Along with 750 mg via G tube bid @ 4 pm & 8 pm       ALLERGIES     Patient has no known allergies.     FAMILY HISTORY       Family History Problem Relation Age of Onset    Family history unknown: Yes          SOCIAL HISTORY       Social History     Social History    Marital status: Single     Spouse name: N/A    Number of children: 0    Years of education: N/A     Occupational History    SSI      Social History Main Topics    Smoking status: Never Smoker    Smokeless tobacco: Never Used    Alcohol use No    Drug use: No    Sexual activity: Not Currently     Other Topics Concern    None     Social History Narrative    Has some family, mother and sister in [place], they visit occasionally    POA APSI                   SCREENINGS    Mel Coma Scale  Eye Opening: Spontaneous  Best Verbal Response: Confused  Best Motor Response: Obeys commands  Eml Coma Scale Score: 14        PHYSICAL EXAM    (up to 7 for level 4, 8 or more for level 5)     ED Triage Vitals [09/24/18 0713]   BP Temp Temp Source Pulse Resp SpO2 Height Weight   (!) 153/101 96.8 °F (36 °C) Temporal 81 20 93 % 5' 3\" (1.6 m) 200 lb (90.7 kg)       Physical Exam   Constitutional: She appears well-developed and well-nourished. No distress. HENT:   Head: Normocephalic and atraumatic. Right Ear: External ear normal.   Left Ear: External ear normal.   Mouth/Throat: No oropharyngeal exudate. Eyes: Pupils are equal, round, and reactive to light. Conjunctivae are normal.   Neck: Normal range of motion. Neck supple. No JVD present. No tracheal deviation present. No thyromegaly present. Cardiovascular: Normal rate and normal heart sounds. No murmur heard. Pulmonary/Chest: Effort normal and breath sounds normal. No respiratory distress. She has no wheezes. Abdominal: Soft. Bowel sounds are normal. There is no tenderness. There is no guarding. Musculoskeletal: Normal range of motion. She exhibits no edema. Neurological: She is alert. No cranial nerve deficit. Skin: Skin is warm and dry. No rash noted. She is not diaphoretic.    Psychiatric: She has a normal mood and

## 2018-09-24 NOTE — LETTER
[facility]) [facility]  Phone: [phone]               September 24, 2018    Patient: Agustin Valadez   YOB: 1960   Date of Visit: 9/24/2018       To Whom It May Concern:    Agustin Valadez was seen and treated in our emergency department on 9/24/2018. She may return to work on 9/24/18.       Sincerely,       Mehreen Escudero RN         Signature:__________________________________

## 2018-09-24 NOTE — ED TRIAGE NOTES
Pt presents to ED by squad from group home. Per squad pt was found out of wheelchair face down on tile floor. The fall was unwitnessed per squad with unknown down time. Pt is not on any blood thinners. Pt is denying any pain & there are no injuries noted. Pt is A&O to self which is her normal mentation per squad. Pt follows simple commands.

## 2018-10-02 LAB
BASOPHILS ABSOLUTE: 0 K/UL (ref 0–0.2)
BASOPHILS RELATIVE PERCENT: 0.5 %
EOSINOPHILS ABSOLUTE: 0.2 K/UL (ref 0–0.7)
EOSINOPHILS RELATIVE PERCENT: 2.1 %
HCT VFR BLD CALC: 41.2 % (ref 37–47)
HEMOGLOBIN: 13.8 G/DL (ref 12–16)
LYMPHOCYTES ABSOLUTE: 2.2 K/UL (ref 1–4.8)
LYMPHOCYTES RELATIVE PERCENT: 28.4 %
MCH RBC QN AUTO: 34.3 PG (ref 27–31.3)
MCHC RBC AUTO-ENTMCNC: 33.5 % (ref 33–37)
MCV RBC AUTO: 102.3 FL (ref 82–100)
MONOCYTES ABSOLUTE: 1.1 K/UL (ref 0.2–0.8)
MONOCYTES RELATIVE PERCENT: 14.1 %
NEUTROPHILS ABSOLUTE: 4.3 K/UL (ref 1.4–6.5)
NEUTROPHILS RELATIVE PERCENT: 54.9 %
PDW BLD-RTO: 13.5 % (ref 11.5–14.5)
PLATELET # BLD: 96 K/UL (ref 130–400)
PLATELET SLIDE REVIEW: ABNORMAL
RBC # BLD: 4.03 M/UL (ref 4.2–5.4)
WBC # BLD: 7.8 K/UL (ref 4.8–10.8)

## 2018-10-03 ENCOUNTER — CLINICAL DOCUMENTATION (OUTPATIENT)
Dept: PHYSICAL THERAPY | Age: 58
End: 2018-10-03

## 2018-10-12 RX ORDER — AMOXICILLIN 250 MG
1 CAPSULE ORAL
Qty: 12 TABLET | Refills: 3 | Status: SHIPPED | OUTPATIENT
Start: 2018-10-12 | End: 2018-10-12 | Stop reason: SDUPTHER

## 2018-10-12 RX ORDER — AMOXICILLIN 250 MG
1 CAPSULE ORAL
Qty: 12 TABLET | Refills: 3 | Status: SHIPPED | OUTPATIENT
Start: 2018-10-12 | End: 2018-10-29

## 2018-10-15 ENCOUNTER — APPOINTMENT (OUTPATIENT)
Dept: GENERAL RADIOLOGY | Age: 58
End: 2018-10-15
Payer: MEDICARE

## 2018-10-15 ENCOUNTER — HOSPITAL ENCOUNTER (EMERGENCY)
Age: 58
Discharge: HOME | End: 2018-10-15
Attending: EMERGENCY MEDICINE
Payer: MEDICARE

## 2018-10-15 VITALS
SYSTOLIC BLOOD PRESSURE: 140 MMHG | RESPIRATION RATE: 18 BRPM | OXYGEN SATURATION: 93 % | HEART RATE: 81 BPM | TEMPERATURE: 99.1 F | DIASTOLIC BLOOD PRESSURE: 94 MMHG

## 2018-10-15 DIAGNOSIS — J40 BRONCHITIS: ICD-10-CM

## 2018-10-15 DIAGNOSIS — R09.81 NASAL CONGESTION: Primary | ICD-10-CM

## 2018-10-15 LAB
ALBUMIN SERPL-MCNC: 3.3 G/DL (ref 3.9–4.9)
ALP BLD-CCNC: 106 U/L (ref 40–130)
ALT SERPL-CCNC: 9 U/L (ref 0–33)
ANION GAP SERPL CALCULATED.3IONS-SCNC: 10 MEQ/L (ref 7–13)
AST SERPL-CCNC: 28 U/L (ref 0–35)
BASOPHILS ABSOLUTE: 0.1 K/UL (ref 0–0.2)
BASOPHILS RELATIVE PERCENT: 0.6 %
BILIRUB SERPL-MCNC: 0.3 MG/DL (ref 0–1.2)
BUN BLDV-MCNC: 19 MG/DL (ref 6–20)
CALCIUM SERPL-MCNC: 9.4 MG/DL (ref 8.6–10.2)
CHLORIDE BLD-SCNC: 93 MEQ/L (ref 98–107)
CO2: 33 MEQ/L (ref 22–29)
CREAT SERPL-MCNC: 0.84 MG/DL (ref 0.5–0.9)
EOSINOPHILS ABSOLUTE: 0.1 K/UL (ref 0–0.7)
EOSINOPHILS RELATIVE PERCENT: 1.4 %
GFR AFRICAN AMERICAN: >60
GFR NON-AFRICAN AMERICAN: >60
GLOBULIN: 4.3 G/DL (ref 2.3–3.5)
GLUCOSE BLD-MCNC: 188 MG/DL (ref 74–109)
HCT VFR BLD CALC: 41.8 % (ref 37–47)
HEMOGLOBIN: 14 G/DL (ref 12–16)
LACTIC ACID: 1.9 MMOL/L (ref 0.5–2.2)
LIPASE: 20 U/L (ref 13–60)
LYMPHOCYTES ABSOLUTE: 2.3 K/UL (ref 1–4.8)
LYMPHOCYTES RELATIVE PERCENT: 21.9 %
MCH RBC QN AUTO: 33.3 PG (ref 27–31.3)
MCHC RBC AUTO-ENTMCNC: 33.5 % (ref 33–37)
MCV RBC AUTO: 99.4 FL (ref 82–100)
MONOCYTES ABSOLUTE: 1.6 K/UL (ref 0.2–0.8)
MONOCYTES RELATIVE PERCENT: 14.8 %
NEUTROPHILS ABSOLUTE: 6.5 K/UL (ref 1.4–6.5)
NEUTROPHILS RELATIVE PERCENT: 61.3 %
PDW BLD-RTO: 13.1 % (ref 11.5–14.5)
PLATELET # BLD: 134 K/UL (ref 130–400)
POTASSIUM SERPL-SCNC: 5 MEQ/L (ref 3.5–5.1)
RBC # BLD: 4.2 M/UL (ref 4.2–5.4)
SODIUM BLD-SCNC: 136 MEQ/L (ref 132–144)
TOTAL PROTEIN: 7.6 G/DL (ref 6.4–8.1)
WBC # BLD: 10.6 K/UL (ref 4.8–10.8)

## 2018-10-15 PROCEDURE — 80053 COMPREHEN METABOLIC PANEL: CPT

## 2018-10-15 PROCEDURE — 87040 BLOOD CULTURE FOR BACTERIA: CPT

## 2018-10-15 PROCEDURE — 6370000000 HC RX 637 (ALT 250 FOR IP): Performed by: EMERGENCY MEDICINE

## 2018-10-15 PROCEDURE — 94640 AIRWAY INHALATION TREATMENT: CPT

## 2018-10-15 PROCEDURE — 83690 ASSAY OF LIPASE: CPT

## 2018-10-15 PROCEDURE — 83605 ASSAY OF LACTIC ACID: CPT

## 2018-10-15 PROCEDURE — 85025 COMPLETE CBC W/AUTO DIFF WBC: CPT

## 2018-10-15 PROCEDURE — 36415 COLL VENOUS BLD VENIPUNCTURE: CPT

## 2018-10-15 PROCEDURE — 2580000003 HC RX 258: Performed by: EMERGENCY MEDICINE

## 2018-10-15 PROCEDURE — 99285 EMERGENCY DEPT VISIT HI MDM: CPT

## 2018-10-15 PROCEDURE — 71045 X-RAY EXAM CHEST 1 VIEW: CPT

## 2018-10-15 RX ORDER — SODIUM CHLORIDE 9 MG/ML
INJECTION, SOLUTION INTRAVENOUS CONTINUOUS
Status: DISCONTINUED | OUTPATIENT
Start: 2018-10-15 | End: 2018-10-15 | Stop reason: HOSPADM

## 2018-10-15 RX ORDER — ACETAMINOPHEN 160 MG/5ML
650 SOLUTION ORAL ONCE
Status: COMPLETED | OUTPATIENT
Start: 2018-10-15 | End: 2018-10-15

## 2018-10-15 RX ORDER — AMOXICILLIN AND CLAVULANATE POTASSIUM 600; 42.9 MG/5ML; MG/5ML
10 POWDER, FOR SUSPENSION ORAL 2 TIMES DAILY
Qty: 200 ML | Refills: 0 | Status: SHIPPED | OUTPATIENT
Start: 2018-10-15 | End: 2018-10-25

## 2018-10-15 RX ORDER — IPRATROPIUM BROMIDE AND ALBUTEROL SULFATE 2.5; .5 MG/3ML; MG/3ML
1 SOLUTION RESPIRATORY (INHALATION) ONCE
Status: COMPLETED | OUTPATIENT
Start: 2018-10-15 | End: 2018-10-15

## 2018-10-15 RX ORDER — SODIUM CHLORIDE 0.9 % (FLUSH) 0.9 %
3 SYRINGE (ML) INJECTION EVERY 8 HOURS
Status: DISCONTINUED | OUTPATIENT
Start: 2018-10-15 | End: 2018-10-15 | Stop reason: HOSPADM

## 2018-10-15 RX ADMIN — ACETAMINOPHEN 650 MG: 325 SOLUTION ORAL at 13:26

## 2018-10-15 RX ADMIN — Medication 3 ML: at 13:54

## 2018-10-15 RX ADMIN — IPRATROPIUM BROMIDE AND ALBUTEROL SULFATE 1 AMPULE: .5; 3 SOLUTION RESPIRATORY (INHALATION) at 13:27

## 2018-10-15 RX ADMIN — SODIUM CHLORIDE: 9 INJECTION, SOLUTION INTRAVENOUS at 13:53

## 2018-10-15 ASSESSMENT — PAIN SCALES - GENERAL: PAINLEVEL_OUTOF10: 0

## 2018-10-15 NOTE — ED NOTES
Bed: 08  Expected date: 10/15/18  Expected time:   Means of arrival:   Comments:  Female, from [place], c/o SOB. Runny nose, LS clear via EMS.   93% RA, 98.6 temp     Jossie Branch RN  10/15/18 0432

## 2018-10-15 NOTE — ED PROVIDER NOTES
[address] ED  eMERGENCY dEPARTMENT eNCOUnter      Pt Name: Sheridan Lundberg  MRN: [id number]  [place] 1960  Date of evaluation: 10/15/2018  Provider: Darion Petit MD    [address] [place] [address]       Chief Complaint   Patient presents with    Shortness of Breath     SOB with a runny nose, onset today in workshop at [address] [place] [address]   (Location/Symptom, Timing/Onset,Context/Setting, Quality, Duration, Modifying Factors, Severity)  Note limiting factors. Sheridan Lundberg is a 60 y.o. female who presents to the emergency department Patient noted to have cold cough congestion and low-grade fever and short of breath patient has even mental retardation obesity hypertension hypercholesterolemia on the left and right kidney as well as history of sleep apnea paramedics at the scene noted her 9394% saturation on room air brought it here for evaluation of cough congestion and short of breath started today    HPI    NursingNotes were reviewed. REVIEW OF SYSTEMS    (2-9 systems for level 4, 10 or more for level 5)     Review of Systems    Except as noted above the remainder of the review of systems was reviewed and negative.        PAST MEDICAL HISTORY     Past Medical History:   Diagnosis Date    Bipolar affective disorder, current episode manic with psychotic symptoms ([address] 08/19/2013    Dr Latif Labor Depression     Diastolic heart failure, NYHA class 1 ([address] 2015    2 D Echo, impaired relaxation, EF 50%    Dysphagia, oropharyngeal phase     Elevated diaphragm 2016    R side    History of Clostridium difficile colitis 1/2014    fecal transplant    Kidney congenitally absent, right     CT 2014    Left knee DJD 2013    severe, Dr Garry Acosta    Localized, primary osteoarthritis of lower leg     Lower extremity pain, bilateral     Lymphedema of both lower extremities     lymphedema tarda    Mental retardation, moderate (I.Q. 35-49)     Mixed sleep apnea     Morbid obesity with alveolar History     Social History    Marital status: Single     Spouse name: N/A    Number of children: 0    Years of education: N/A     Occupational History    SSI      Social History Main Topics    Smoking status: Never Smoker    Smokeless tobacco: Never Used    Alcohol use No    Drug use: No    Sexual activity: Not Currently     Other Topics Concern    None     Social History Tavia    Has some family, mother and sister in [place], they visit occasionally    POA APSI                   SCREENINGS      @FLOW([id number])@      PHYSICAL EXAM    (up to 7 for level 4, 8 or more for level 5)     ED Triage Vitals   BP Temp Temp Source Pulse Resp SpO2 Height Weight   10/15/18 1304 10/15/18 1300 10/15/18 1300 10/15/18 1300 10/15/18 1300 10/15/18 1300 -- --   (!) 178/117 99.1 °F (37.3 °C) Oral 100 20 93 %         Physical Exam   Constitutional: She is oriented to person, place, and time. She appears well-developed and well-nourished. No distress. HENT:   Head: Normocephalic. Right Ear: External ear normal.   Left Ear: External ear normal.   Mouth/Throat: Oropharynx is clear and moist.   Patient complains emergency hand looks and sounds very congested nasally with the clear nasal discharge and mucopurulent discharge   Eyes: Pupils are equal, round, and reactive to light. Right eye exhibits no discharge. Left eye exhibits no discharge. Neck: Normal range of motion. Neck supple. No JVD present. No tracheal deviation present. Cardiovascular: Normal rate and normal heart sounds. Exam reveals no gallop. No murmur heard. Pulmonary/Chest: No respiratory distress. She has wheezes. Abdominal: Soft. Bowel sounds are normal. She exhibits no mass. There is no rebound. Musculoskeletal: Normal range of motion. She exhibits no edema or tenderness. Lymphadenopathy:     She has no cervical adenopathy. Neurological: She is alert and oriented to person, place, and time. No cranial nerve deficit.  She exhibits normal muscle tone. Skin: Skin is warm. No rash noted. No erythema. Psychiatric: Her behavior is normal. Thought content normal.   Vitals reviewed. DIAGNOSTIC RESULTS     EKG: All EKG's are interpreted by the Emergency Department Physician who either signs or Co-signsthis chart in the absence of a cardiologist.        RADIOLOGY:   Sonido Bolder such as CT, Ultrasound and MRI are read by the radiologist. Plain radiographic images are visualized and preliminarily interpreted by the emergency physician with the below findings:        Interpretation per the Radiologist below, if available at the time ofthis note:    XR CHEST PORTABLE   Final Result   No acute cardiopulmonary process seen, when compared with previous examination there is no significant change. ED BEDSIDE ULTRASOUND:   Performed by ED Physician - none    LABS:  Labs Reviewed   CBC WITH AUTO DIFFERENTIAL - Abnormal; Notable for the following:        Result Value    MCH 33.3 (*)     Monocytes # 1.6 (*)     All other components within normal limits   COMPREHENSIVE METABOLIC PANEL - Abnormal; Notable for the following:     Chloride 93 (*)     CO2 33 (*)     Glucose 188 (*)     Alb 3.3 (*)     Globulin 4.3 (*)     All other components within normal limits   CULTURE BLOOD #1   CULTURE BLOOD #2   LIPASE   LACTIC ACID, PLASMA   URINE RT REFLEX TO CULTURE       All other labs were within normal range or not returned as of this dictation. EMERGENCY DEPARTMENT COURSE and DIFFERENTIAL DIAGNOSIS/MDM:   Vitals:    Vitals:    10/15/18 1300 10/15/18 1304 10/15/18 1527   BP:  (!) 178/117    Pulse: 100  82   Resp: 20  18   Temp: 99.1 °F (37.3 °C)     TempSrc: Oral     SpO2: 93%  93%           MDM    CRITICAL CARE TIME   Total Critical Care time was minutes, excluding separately reportableprocedures.   There was a high probability of clinicallysignificant/life threatening deterioration in the patient's condition which required my urgent

## 2018-10-19 DIAGNOSIS — D69.6 THROMBOCYTOPENIA (HCC): Primary | ICD-10-CM

## 2018-10-20 LAB
BLOOD CULTURE, ROUTINE: NORMAL
CULTURE, BLOOD 2: NORMAL

## 2018-10-24 ENCOUNTER — OFFICE VISIT (OUTPATIENT)
Dept: INTERNAL MEDICINE CLINIC | Age: 58
End: 2018-10-24
Payer: MEDICARE

## 2018-10-24 ENCOUNTER — HOSPITAL ENCOUNTER (EMERGENCY)
Age: 58
Discharge: HOME | End: 2018-10-24
Payer: MEDICARE

## 2018-10-24 VITALS
SYSTOLIC BLOOD PRESSURE: 148 MMHG | OXYGEN SATURATION: 94 % | TEMPERATURE: 98 F | DIASTOLIC BLOOD PRESSURE: 87 MMHG | BODY MASS INDEX: 36.8 KG/M2 | HEIGHT: 62 IN | RESPIRATION RATE: 18 BRPM | HEART RATE: 78 BPM | WEIGHT: 200 LBS

## 2018-10-24 VITALS
HEART RATE: 81 BPM | DIASTOLIC BLOOD PRESSURE: 80 MMHG | OXYGEN SATURATION: 94 % | TEMPERATURE: 96.8 F | BODY MASS INDEX: 35.43 KG/M2 | SYSTOLIC BLOOD PRESSURE: 110 MMHG | HEIGHT: 63 IN

## 2018-10-24 DIAGNOSIS — Z87.09 HISTORY OF ACUTE BRONCHITIS: ICD-10-CM

## 2018-10-24 DIAGNOSIS — Z87.09: ICD-10-CM

## 2018-10-24 DIAGNOSIS — S81.812A LACERATION OF LEFT LOWER EXTREMITY, INITIAL ENCOUNTER: Primary | ICD-10-CM

## 2018-10-24 DIAGNOSIS — R05.9 COUGH: Primary | ICD-10-CM

## 2018-10-24 PROBLEM — Z93.1 G TUBE FEEDINGS (HCC): Status: ACTIVE | Noted: 2018-10-24

## 2018-10-24 PROBLEM — D69.6 THROMBOCYTOPENIA (HCC): Status: ACTIVE | Noted: 2018-10-24

## 2018-10-24 PROCEDURE — 1036F TOBACCO NON-USER: CPT | Performed by: INTERNAL MEDICINE

## 2018-10-24 PROCEDURE — G8417 CALC BMI ABV UP PARAM F/U: HCPCS | Performed by: INTERNAL MEDICINE

## 2018-10-24 PROCEDURE — 12002 RPR S/N/AX/GEN/TRNK2.6-7.5CM: CPT

## 2018-10-24 PROCEDURE — 99282 EMERGENCY DEPT VISIT SF MDM: CPT

## 2018-10-24 PROCEDURE — G8482 FLU IMMUNIZE ORDER/ADMIN: HCPCS | Performed by: INTERNAL MEDICINE

## 2018-10-24 PROCEDURE — 3017F COLORECTAL CA SCREEN DOC REV: CPT | Performed by: INTERNAL MEDICINE

## 2018-10-24 PROCEDURE — 99213 OFFICE O/P EST LOW 20 MIN: CPT | Performed by: INTERNAL MEDICINE

## 2018-10-24 PROCEDURE — 90715 TDAP VACCINE 7 YRS/> IM: CPT | Performed by: NURSE PRACTITIONER

## 2018-10-24 PROCEDURE — 6360000002 HC RX W HCPCS: Performed by: NURSE PRACTITIONER

## 2018-10-24 PROCEDURE — 90471 IMMUNIZATION ADMIN: CPT | Performed by: NURSE PRACTITIONER

## 2018-10-24 PROCEDURE — G8427 DOCREV CUR MEDS BY ELIG CLIN: HCPCS | Performed by: INTERNAL MEDICINE

## 2018-10-24 PROCEDURE — 6370000000 HC RX 637 (ALT 250 FOR IP): Performed by: NURSE PRACTITIONER

## 2018-10-24 RX ORDER — LIDOCAINE HYDROCHLORIDE AND EPINEPHRINE 10; 10 MG/ML; UG/ML
20 INJECTION, SOLUTION INFILTRATION; PERINEURAL ONCE
Status: DISCONTINUED | OUTPATIENT
Start: 2018-10-24 | End: 2018-10-24 | Stop reason: HOSPADM

## 2018-10-24 RX ADMIN — Medication 3 ML: at 17:32

## 2018-10-24 RX ADMIN — TETANUS TOXOID, REDUCED DIPHTHERIA TOXOID AND ACELLULAR PERTUSSIS VACCINE, ADSORBED 0.5 ML: 5; 2.5; 8; 8; 2.5 SUSPENSION INTRAMUSCULAR at 18:04

## 2018-10-24 ASSESSMENT — ENCOUNTER SYMPTOMS
BACK PAIN: 0
WHEEZING: 0
TROUBLE SWALLOWING: 1
VOMITING: 0
ABDOMINAL DISTENTION: 0
COUGH: 1
SORE THROAT: 0
DIARRHEA: 0
RHINORRHEA: 0
PHOTOPHOBIA: 0
SHORTNESS OF BREATH: 0
NAUSEA: 0
HEMOPTYSIS: 0
SHORTNESS OF BREATH: 0
EYE PAIN: 0
ABDOMINAL PAIN: 0
VOICE CHANGE: 0
COUGH: 0

## 2018-10-24 NOTE — PROGRESS NOTES
Comment: >60 mL/min/1.73m2 EGFR, calc. for ages 25 and older using the  MDRD formula (not corrected for weight), is valid for stable  renal function.  GFR  09/24/2018 >60.0  >60 Final    Comment: >60 mL/min/1.73m2 EGFR, calc. for ages 25 and older using the  MDRD formula (not corrected for weight), is valid for stable  renal function.  Calcium 09/24/2018 9.2  8.6 - 10.2 mg/dL Final    Total Protein 09/24/2018 7.1  6.4 - 8.1 g/dL Final    Alb 09/24/2018 3.4* 3.9 - 4.9 g/dL Final    Total Bilirubin 09/24/2018 0.3  0.0 - 1.2 mg/dL Final    Alkaline Phosphatase 09/24/2018 84  40 - 130 U/L Final    ALT 09/24/2018 12  0 - 33 U/L Final    Comment: Specimen hemolysis has exceeded the interference as defined  by Roche. Result may be affected. Suggest recollection if  clinically indicated.  AST 09/24/2018 48* 0 - 35 U/L Final    Comment: Specimen hemolysis has exceeded the interference as defined  by Roche. Value may be falsely increased. Suggest  recollection if clinically indicated.       Globulin 09/24/2018 3.7* 2.3 - 3.5 g/dL Final    Total CK 09/24/2018 832* 0 - 170 U/L Final    CK-MB 09/24/2018 18.7* 0.0 - 3.8 ng/mL Final    CK-MB Index 09/24/2018 2.2  0.0 - 3.5 % Final   Orders Only on 09/18/2018   Component Date Value Ref Range Status    WBC 09/18/2018 7.4  4.8 - 10.8 K/uL Final    RBC 09/18/2018 4.13* 4.20 - 5.40 M/uL Final    Hemoglobin 09/18/2018 14.3  12.0 - 16.0 g/dL Final    Hematocrit 09/18/2018 42.3  37.0 - 47.0 % Final    MCV 09/18/2018 102.3* 82.0 - 100.0 fL Final    MCH 09/18/2018 34.5* 27.0 - 31.3 pg Final    MCHC 09/18/2018 33.7  33.0 - 37.0 % Final    RDW 09/18/2018 13.7  11.5 - 14.5 % Final    Platelets 48/56/2137 88* 130 - 400 K/uL Final    PLATELET SLIDE REVIEW 09/18/2018 Decreased   Final    Neutrophils % 09/18/2018 54.8  % Final    Lymphocytes % 09/18/2018 29.7  % Final    Monocytes % 09/18/2018 12.8  % Final    Eosinophils % 09/18/2018 2.2 been gradually improving. The cough is non-productive. Pertinent negatives include no fever, hemoptysis, shortness of breath or wheezing. Nothing aggravates the symptoms. She has tried a beta-agonist inhaler and rest (antibiotic) for the symptoms. The treatment provided significant relief. Her past medical history is significant for pneumonia. There is no history of asthma, bronchiectasis or COPD. More detail above in the chiefcomplaint(s), interim history and below in the review of systems.      Past Medical History:   Diagnosis Date    Bipolar affective disorder, current episode manic with psychotic symptoms ([facility] [address] 08/19/2013    Dr Rabia Lindsey Diastolic heart failure, NYHA class 1 ([address] 2015    2 D Echo, impaired relaxation, EF 50%    Dysphagia, oropharyngeal phase     Elevated diaphragm 2016    R side    History of Clostridium difficile colitis 1/2014    fecal transplant    Kidney congenitally absent, right     CT 2014    Left knee DJD 2013    severe, Dr Dicky Moritz    Localized, primary osteoarthritis of lower leg     Lower extremity pain, bilateral     Lymphedema of both lower extremities     lymphedema tarda    Mental retardation, moderate (I.Q. 35-49)     Mixed sleep apnea     Morbid obesity with alveolar hypoventilation (HCC)     Nocturnal hypoxemia due to obesity     Other primary thrombocytopenia (HCC)     Other specified behavioral problem     PEG (percutaneous endoscopic gastrostomy) status ([facility] [address] 2013    due to aspiration pneumonia (water&meds)    Pulmonary aspiration, history of     S/P colonoscopy 2011    normal    Sinus tachycardia     Solitary cyst of kidney 2014    Vitamin D deficiency        Past Surgical History:   Procedure Laterality Date    GASTROSTOMY TUBE CHANGE  04/08/15    GASTROSTOMY TUBE PLACEMENT  8/13/13    Dr Tiffanie Hanks HISTORY  7/9/14    Change of G tube       Social History     Social History    Marital status: Single     Spouse  mg by PEG Tube route daily. No current facility-administered medications on file prior to visit. Review of Systems   Unable to perform ROS: Psychiatric disorder   Constitutional: Negative for appetite change, diaphoresis, fever and unexpected weight change. HENT: Positive for trouble swallowing (chronic). Negative for nosebleeds and voice change. Respiratory: Positive for cough. Negative for hemoptysis, shortness of breath and wheezing. Cardiovascular: Positive for leg swelling (chronic). Gastrointestinal: Negative for abdominal distention. Endocrine: Negative for cold intolerance and heat intolerance. Genitourinary: Negative for difficulty urinating. Neurological: Negative for syncope. Psychiatric/Behavioral: Negative for agitation and self-injury. Vitals:    10/24/18 1147   BP: 110/80   Site: Left Upper Arm   Position: Sitting   Cuff Size: Large Adult   Pulse: 81   Temp: 96.8 °F (36 °C)   TempSrc: Tympanic   SpO2: 94%   Height: 5' 3\" (1.6 m)       Physical Exam   Constitutional: No distress. Obese, age appropriate, well groomed  Makes eye contact  No cough during today's office visit     HENT:   Head: Atraumatic. Neck: Neck supple. Cardiovascular: Regular rhythm. Exam reveals no gallop. There is  4+ bilateral leg edema below the knees, left leg being worse, chronic   Pulmonary/Chest: Effort normal and breath sounds normal. She has no wheezes. She has no rales. Diminished breath sounds bilaterally due to poor ventilatory effort      Abdominal: Soft. She exhibits no distension. Musculoskeletal:   AFO on the right leg only, left foot in hyperflexion   Neurological: She is alert. She exhibits normal muscle tone. Moves all extremities with equal strength   Skin: Skin is warm. There is pallor. Psychiatric: Her mood appears not anxious. Her speech is tangential and slurred. She is not agitated and not withdrawn. Cognition and memory are impaired.  She does not exhibit a depressed mood. Makes eye contact  Attempts at verbal communication She is inattentive. Assessment:    Mayda was seen today for ed follow-up and cough. Diagnoses and all orders for this visit:    Cough           Post infectious cough, continue over-the-counter cough syrup as needed, follow to resolution    History of acute bronchitis           Of recent onset, chest x-ray ruled out pneumonia, treatment completed    Pulmonary aspiration, history of            Remote, no evidence of recurrence on recent chest x-ray, continue usual aspiration precautions        Plan:    Reviewed with the patient (/and caregiver if present): current health status, medications, activities and diet. See also orders and comments in the assessment section. Today's diagnosis (in the context ofchronic problems) was discussed with patient (/and caregiver if present), questions answered. The current medical regimen is effective;  continue present plan and medications. Monthly orders for the prison were reviewed and signed in the interim. Close follow up needed to evaluate treatment results and for carecoordination. Return if symptoms worsen or fail to improve. I have reviewed the patient's medical and surgical, family and social history, health maintenance schedule, and updated the computerized patient record. Please note this reporthas been partially produced by using speech recognition hardware. It may contain errors related to the system, including grammar, punctuation and spelling as well as words and phrases that may seem inaccurate. For anyquestions or concerns, please feel free to contact me for clarification.         Electronically signed by Irma Cabral MD

## 2018-10-24 NOTE — ED TRIAGE NOTES
Pt phil ed from nursing home/group home. With laceration to left shin. [facility] with sharp surface of WC  Laceration to left chine, deep into fascia, about 4cm/1 cm. Bleeding controlled, seeping serious fluid from wound saturated abd dressing, pants and draw sheet. PT is developmentally delayed, acting appropriate for DX. responding well to staff and redirection.

## 2018-10-24 NOTE — ED PROVIDER NOTES
(90.7 kg)       Physical Exam   Constitutional: She is oriented to person, place, and time. She appears well-developed and well-nourished. She is active. No distress. HENT:   Head: Normocephalic and atraumatic. Mouth/Throat: Mucous membranes are normal.   Neck: Normal range of motion. Neck supple. Cardiovascular: Normal rate, regular rhythm, normal heart sounds, intact distal pulses and normal pulses. Pulmonary/Chest: Effort normal and breath sounds normal.   Abdominal: Soft. Normal appearance and bowel sounds are normal. There is no tenderness. Musculoskeletal: She exhibits edema (bilat). Left lower leg: She exhibits laceration. Legs:  Neurological: She is alert and oriented to person, place, and time. She has normal strength. Skin: Skin is warm, dry and intact. No rash noted. She is not diaphoretic. Nursing note and vitals reviewed. DIAGNOSTIC RESULTS     EKG: All EKG's are interpreted by the Emergency Department Physician who either signs or Co-signsthis chart in the absence of a cardiologist.        RADIOLOGY:   Andrew Broad such as CT, Ultrasound and MRI are read by the radiologist. Plain radiographic images are visualized and preliminarily interpreted by the emergency physician with the below findings:        Interpretation per the Radiologist below, if available at the time ofthis note:    No orders to display         ED BEDSIDE ULTRASOUND:   Performed by ED Physician - none    LABS:  Labs Reviewed - No data to display    All other labs were within normal range or not returned as of this dictation. EMERGENCY DEPARTMENT COURSE and DIFFERENTIAL DIAGNOSIS/MDM:   Vitals:    Vitals:    10/24/18 1649   BP: (!) 150/89   Pulse: 77   Temp: 98 °F (36.7 °C)   TempSrc: Tympanic   SpO2: 94%   Weight: 200 lb (90.7 kg)   Height: 5' 2\" (1.575 m)            MDM let was applied. Wound was cleansed. Suturing was completed which the patient tolerated well.   She will be discharged

## 2018-10-24 NOTE — ED NOTES
Bed: 02  Expected date: 10/24/18  Expected time: 4:36 PM  Means of arrival: [facility]  Comments:  58yof leg leg mrdd patient has staff coming with pt     Callum Desai RN  10/24/18 0989

## 2018-10-25 ENCOUNTER — OFFICE VISIT (OUTPATIENT)
Dept: INTERNAL MEDICINE CLINIC | Age: 58
End: 2018-10-25
Payer: MEDICARE

## 2018-10-25 VITALS
HEART RATE: 82 BPM | SYSTOLIC BLOOD PRESSURE: 118 MMHG | RESPIRATION RATE: 14 BRPM | TEMPERATURE: 97.9 F | OXYGEN SATURATION: 93 % | DIASTOLIC BLOOD PRESSURE: 61 MMHG

## 2018-10-25 DIAGNOSIS — S81.812S LACERATION OF LEFT LOWER EXTREMITY, SEQUELA: Primary | ICD-10-CM

## 2018-10-25 DIAGNOSIS — R60.0 PEDAL EDEMA: ICD-10-CM

## 2018-10-25 PROCEDURE — 3017F COLORECTAL CA SCREEN DOC REV: CPT | Performed by: FAMILY MEDICINE

## 2018-10-25 PROCEDURE — G8482 FLU IMMUNIZE ORDER/ADMIN: HCPCS | Performed by: FAMILY MEDICINE

## 2018-10-25 PROCEDURE — 99213 OFFICE O/P EST LOW 20 MIN: CPT | Performed by: FAMILY MEDICINE

## 2018-10-25 PROCEDURE — 1036F TOBACCO NON-USER: CPT | Performed by: FAMILY MEDICINE

## 2018-10-25 PROCEDURE — G8417 CALC BMI ABV UP PARAM F/U: HCPCS | Performed by: FAMILY MEDICINE

## 2018-10-25 PROCEDURE — G8427 DOCREV CUR MEDS BY ELIG CLIN: HCPCS | Performed by: FAMILY MEDICINE

## 2018-10-25 RX ORDER — ASCORBIC ACID 500 MG
500 TABLET ORAL DAILY
Qty: 30 TABLET | Refills: 0 | Status: SHIPPED | OUTPATIENT
Start: 2018-10-25 | End: 2018-12-18 | Stop reason: ALTCHOICE

## 2018-10-25 NOTE — PATIENT INSTRUCTIONS
above the level of your heart. This will help reduce swelling. · Avoid any activity that could cause your cut to reopen. · Do not remove the stitches on your own. Your doctor will tell you when to come back to have the stitches removed. · Leave Steri-Strips on until they fall off. · Be safe with medicines. Read and follow all instructions on the label. ¨ If the doctor gave you a prescription medicine for pain, take it as prescribed. ¨ If you are not taking a prescription pain medicine, ask your doctor if you can take an over-the-counter medicine. When should you call for help? Call your doctor now or seek immediate medical care if:    · You have new pain, or your pain gets worse.     · The skin near the cut is cold or pale or changes color.     · You have tingling, weakness, or numbness near the cut.     · The cut starts to bleed, and blood soaks through the bandage. Oozing small amounts of blood is normal.     · You have trouble moving the area near the cut.     · You have symptoms of infection, such as:  ¨ Increased pain, swelling, warmth, or redness around the cut. ¨ Red streaks leading from the cut. ¨ Pus draining from the cut. ¨ A fever.    Watch closely for changes in your health, and be sure to contact your doctor if:    · The cut reopens.     · You do not get better as expected. Where can you learn more? Go to [email or web address]. org and sign in to your [employer] account. Enter R217 in the [facility] box to learn more about \"Cuts Closed With Stitches: Care Instructions. \"     If you do not have an account, please click on the \"Sign Up Now\" link. Current as of: November 20, 2017  Content Version: 11.7  © 4616-4569 [employer]. Care instructions adapted under license by [facility] ([facility]).  If you have questions about a medical condition or this instruction, always ask your healthcare professional. Yvonne Mendieta any warranty or liability for your use of this information.

## 2018-10-25 NOTE — PROGRESS NOTES
Subjective:      Patient ID: Melonie Lara is a 60 y.o. female who presents for:  Chief Complaint   Patient presents with    Follow-Up from Hospital     Patient presents today with Laceration to her Left leg. Care givers state that she has 5 stitches        Patient is a 60 YOF with PMHx of CHF, HTN, MDD and moderate Mental Retardation who lives in a [address] [place] [address] and presents today for a f/u visit; she has a skin tear on her left lower extremity and was transferred to the ER yesterday where the area affected was sutured together. However, it was thought that she inadvertently acquired the lesion from scraping her left leg on a sharp part of her manual wheelchair; the said event was never witnessed by anyone.       Past Medical History:   Diagnosis Date    Bipolar affective disorder, current episode manic with psychotic symptoms ([address] 08/19/2013    Dr Erin Oden Diastolic heart failure, NYHA class 1 ([address] 2015    2 D Echo, impaired relaxation, EF 50%    Dysphagia, oropharyngeal phase     Elevated diaphragm 2016    R side    History of Clostridium difficile colitis 1/2014    fecal transplant    Kidney congenitally absent, right     CT 2014    Left knee DJD 2013    severe, Dr Harish Vides    Localized, primary osteoarthritis of lower leg     Lower extremity pain, bilateral     Lymphedema of both lower extremities     lymphedema tarda    Mental retardation, moderate (I.Q. 35-49)     Mixed sleep apnea     Morbid obesity with alveolar hypoventilation (HCC)     Nocturnal hypoxemia due to obesity     Other primary thrombocytopenia ([address]     Other specified behavioral problem     PEG (percutaneous endoscopic gastrostomy) status ([address] 2013    due to aspiration pneumonia ([facility])    Pulmonary aspiration, history of     S/P colonoscopy 2011    normal    Sinus tachycardia     Solitary cyst of kidney 2014    Vitamin D deficiency      Past Surgical History:   Procedure Laterality Date   

## 2018-11-01 ENCOUNTER — OFFICE VISIT (OUTPATIENT)
Dept: INTERNAL MEDICINE CLINIC | Age: 58
End: 2018-11-01
Payer: MEDICARE

## 2018-11-01 VITALS
SYSTOLIC BLOOD PRESSURE: 88 MMHG | DIASTOLIC BLOOD PRESSURE: 65 MMHG | OXYGEN SATURATION: 90 % | HEIGHT: 62 IN | RESPIRATION RATE: 14 BRPM | BODY MASS INDEX: 36.58 KG/M2 | TEMPERATURE: 98 F | HEART RATE: 84 BPM

## 2018-11-01 DIAGNOSIS — I95.9 HYPOTENSION, UNSPECIFIED HYPOTENSION TYPE: Primary | ICD-10-CM

## 2018-11-01 DIAGNOSIS — L97.921 ULCER OF LEFT LOWER EXTREMITY, LIMITED TO BREAKDOWN OF SKIN (HCC): ICD-10-CM

## 2018-11-01 DIAGNOSIS — S81.812S LACERATION OF LEFT LOWER EXTREMITY, SEQUELA: ICD-10-CM

## 2018-11-01 PROCEDURE — G8482 FLU IMMUNIZE ORDER/ADMIN: HCPCS | Performed by: FAMILY MEDICINE

## 2018-11-01 PROCEDURE — 3017F COLORECTAL CA SCREEN DOC REV: CPT | Performed by: FAMILY MEDICINE

## 2018-11-01 PROCEDURE — G8427 DOCREV CUR MEDS BY ELIG CLIN: HCPCS | Performed by: FAMILY MEDICINE

## 2018-11-01 PROCEDURE — 99213 OFFICE O/P EST LOW 20 MIN: CPT | Performed by: FAMILY MEDICINE

## 2018-11-01 PROCEDURE — G8417 CALC BMI ABV UP PARAM F/U: HCPCS | Performed by: FAMILY MEDICINE

## 2018-11-01 PROCEDURE — 1036F TOBACCO NON-USER: CPT | Performed by: FAMILY MEDICINE

## 2018-11-01 NOTE — PATIENT INSTRUCTIONS
Patient Education        Wound Check: Care Instructions  Your Care Instructions  People have wounds that need care for many reasons. You may have a cut that needs care after surgery. You may have a cut or puncture wound from an accident. Or you may have a wound because of a condition like diabetes. Whatever the cause of your wound, there are things you can do to care for it at home. Your doctor may also want you to come back for a wound check. The wound check lets the doctor know how your wound is healing and if you need more treatment. Follow-up care is a key part of your treatment and safety. Be sure to make and go to all appointments, and call your doctor if you are having problems. It's also a good idea to know your test results and keep a list of the medicines you take. How can you care for yourself at home? · If your doctor told you how to care for your wound, follow your doctor's instructions. If you did not get instructions, follow this general advice:  ¨ You may cover the wound with a thin layer of petroleum jelly, such as Vaseline, and a nonstick bandage. ¨ Apply more petroleum jelly and replace the bandage as needed. · Keep the wound dry for the first 24 to 48 hours. After this, you can shower if your doctor okays it. Pat the wound dry. · Be safe with medicines. Read and follow all instructions on the label. ¨ If the doctor gave you a prescription medicine for pain, take it as prescribed. ¨ If you are not taking a prescription pain medicine, ask your doctor if you can take an over-the-counter medicine. · If your doctor prescribed antibiotics, take them as directed. Do not stop taking them just because you feel better. You need to take the full course of antibiotics. · If you have stitches, do not remove them on your own. Your doctor will tell you when to come back to have them removed. · If you have Steri-Strips, leave them on until they fall off.   · If possible, prop up the injured area on

## 2018-11-01 NOTE — PROGRESS NOTES
3. Laceration of left lower extremity, sequela           Plan:      No orders of the defined types were placed in this encounter. No orders of the defined types were placed in this encounter.    - HOLD Coreg 6.25 mg tablet this evening and monitor V/S once daily x 1 week. Will reduce Coreg to 3.125 mg twice daily IF she continues to have hypotension.  - continue with Vit C supplement  - wound care done in the office today  - daily wound care and keep sutures in place for 4 more days    Return in about 1 week (around 11/8/2018), or if symptoms worsen or fail to improve.

## 2018-11-07 ENCOUNTER — TELEPHONE (OUTPATIENT)
Dept: SURGERY | Age: 58
End: 2018-11-07

## 2018-11-07 DIAGNOSIS — K94.23 MECHANICAL COMPLICATION OF GASTROSTOMY (HCC): Primary | ICD-10-CM

## 2018-11-13 ENCOUNTER — OFFICE VISIT (OUTPATIENT)
Dept: INTERNAL MEDICINE CLINIC | Age: 58
End: 2018-11-13
Payer: MEDICARE

## 2018-11-13 VITALS
TEMPERATURE: 98.4 F | OXYGEN SATURATION: 93 % | DIASTOLIC BLOOD PRESSURE: 88 MMHG | HEART RATE: 84 BPM | SYSTOLIC BLOOD PRESSURE: 130 MMHG

## 2018-11-13 DIAGNOSIS — Z48.02 ENCOUNTER FOR REMOVAL OF SUTURES: Primary | ICD-10-CM

## 2018-11-13 PROCEDURE — G8427 DOCREV CUR MEDS BY ELIG CLIN: HCPCS | Performed by: INTERNAL MEDICINE

## 2018-11-13 PROCEDURE — 3017F COLORECTAL CA SCREEN DOC REV: CPT | Performed by: INTERNAL MEDICINE

## 2018-11-13 PROCEDURE — G8482 FLU IMMUNIZE ORDER/ADMIN: HCPCS | Performed by: INTERNAL MEDICINE

## 2018-11-13 PROCEDURE — G8417 CALC BMI ABV UP PARAM F/U: HCPCS | Performed by: INTERNAL MEDICINE

## 2018-11-13 PROCEDURE — 99212 OFFICE O/P EST SF 10 MIN: CPT | Performed by: INTERNAL MEDICINE

## 2018-11-13 PROCEDURE — 1036F TOBACCO NON-USER: CPT | Performed by: INTERNAL MEDICINE

## 2018-11-13 ASSESSMENT — ENCOUNTER SYMPTOMS
COUGH: 0
BLOOD IN STOOL: 0

## 2018-11-13 NOTE — PROGRESS NOTES
Not on file     Social History Narrative    Has some family, mother and sister in [place], they visit occasionally    POA APSI                   Family History   Problem Relation Age of Onset    Family history unknown: Yes       Family and socialhistory were reviewed and pertinent changes documented. ALLERGIES    Patient has no known allergies. Current Outpatient Prescriptions on File Prior to Visit   Medication Sig Dispense Refill    magnesium hydroxide (MILK OF MAGNESIA) 400 MG/5ML suspension Give 30 cc per PEG x 1 if no stool x 2 consecutive days 355 mL 1    vitamin C (ASCORBIC ACID) 500 MG tablet Take 1 tablet by mouth daily 30 tablet 0    cloZAPine (CLOZARIL) 25 MG tablet 1 tablet by PEG Tube route 2 times daily 60 tablet 3    OLANZapine (ZYPREXA) 5 MG tablet 1 tablet by PEG Tube route nightly (Patient taking differently: 7.5 mg by PEG Tube route nightly ) 30 tablet 3    Nutritional Supplements (JEVITY 1.2 KENA/FIBER) LIQD Give 3 cans per peg daily 1000 mL 1    furosemide (LASIX) 20 MG tablet 20 mg by PEG Tube route daily      OXYGEN Nocturnal, use as directed 1 Units 0    nystatin (MYCOSTATIN) 868742 UNIT/GM cream Apply topically 2 times daily. 30 g 1    valproate (DEPAKENE) 250 MG/5ML solution 250 mg by Per G Tube route every morning Along with 750 mg via G tube bid @ 4 pm & 8 pm      Cholecalciferol (VITAMIN D) 400 UNIT/ML LIQD 1 mL by PEG Tube route every morning      potassium chloride 20 MEQ/15ML (10%) oral solution 20 mEq by Per G Tube route daily      carvedilol (COREG) 6.25 MG tablet 6.25 mg by PEG Tube route 2 times daily (with meals)       levothyroxine (SYNTHROID) 25 MCG tablet 25 mcg by PEG Tube route Daily.  MAGNESIUM OXIDE  mg by PEG Tube route daily. No current facility-administered medications on file prior to visit. Review of Systems   Constitutional: Negative for activity change, appetite change and fever. HENT: Negative for nosebleeds.

## 2018-11-24 ENCOUNTER — OFFICE VISIT (OUTPATIENT)
Dept: FAMILY MEDICINE CLINIC | Age: 58
End: 2018-11-24
Payer: MEDICARE

## 2018-11-24 ENCOUNTER — HOSPITAL ENCOUNTER (OUTPATIENT)
Dept: GENERAL RADIOLOGY | Age: 58
Discharge: HOME | End: 2018-11-26
Payer: MEDICARE

## 2018-11-24 VITALS
HEART RATE: 79 BPM | TEMPERATURE: 97.7 F | OXYGEN SATURATION: 94 % | SYSTOLIC BLOOD PRESSURE: 122 MMHG | DIASTOLIC BLOOD PRESSURE: 80 MMHG | RESPIRATION RATE: 18 BRPM

## 2018-11-24 DIAGNOSIS — J06.9 ACUTE URI: ICD-10-CM

## 2018-11-24 DIAGNOSIS — J06.9 ACUTE URI: Primary | ICD-10-CM

## 2018-11-24 PROCEDURE — 1036F TOBACCO NON-USER: CPT | Performed by: NURSE PRACTITIONER

## 2018-11-24 PROCEDURE — 3017F COLORECTAL CA SCREEN DOC REV: CPT | Performed by: NURSE PRACTITIONER

## 2018-11-24 PROCEDURE — 99213 OFFICE O/P EST LOW 20 MIN: CPT | Performed by: NURSE PRACTITIONER

## 2018-11-24 PROCEDURE — G8482 FLU IMMUNIZE ORDER/ADMIN: HCPCS | Performed by: NURSE PRACTITIONER

## 2018-11-24 PROCEDURE — 71046 X-RAY EXAM CHEST 2 VIEWS: CPT

## 2018-11-24 PROCEDURE — G8417 CALC BMI ABV UP PARAM F/U: HCPCS | Performed by: NURSE PRACTITIONER

## 2018-11-24 PROCEDURE — G8427 DOCREV CUR MEDS BY ELIG CLIN: HCPCS | Performed by: NURSE PRACTITIONER

## 2018-11-24 RX ORDER — AMOXICILLIN 875 MG/1
875 TABLET, COATED ORAL 2 TIMES DAILY
Qty: 20 TABLET | Refills: 0 | Status: CANCELLED | OUTPATIENT
Start: 2018-11-24 | End: 2018-12-04

## 2018-11-24 RX ORDER — CEFDINIR 250 MG/5ML
600 POWDER, FOR SUSPENSION ORAL DAILY
Qty: 120 ML | Refills: 0 | Status: SHIPPED | OUTPATIENT
Start: 2018-11-24 | End: 2018-12-04 | Stop reason: ALTCHOICE

## 2018-11-24 ASSESSMENT — ENCOUNTER SYMPTOMS
VOMITING: 0
FACIAL SWELLING: 0
EYE DISCHARGE: 1
WHEEZING: 1
EYE ITCHING: 0
CHEST TIGHTNESS: 0
SHORTNESS OF BREATH: 0
STRIDOR: 0
DIARRHEA: 0
TROUBLE SWALLOWING: 0
SINUS PRESSURE: 1
EYE PAIN: 0
NAUSEA: 0
COUGH: 1
ABDOMINAL PAIN: 0
SORE THROAT: 0
EYE REDNESS: 0
RHINORRHEA: 0
PHOTOPHOBIA: 0

## 2018-11-25 ENCOUNTER — CARE COORDINATION (OUTPATIENT)
Dept: CARE COORDINATION | Age: 58
End: 2018-11-25

## 2018-12-03 LAB
ANION GAP SERPL CALCULATED.3IONS-SCNC: 8 MEQ/L (ref 7–13)
BUN BLDV-MCNC: 13 MG/DL (ref 6–20)
CALCIUM SERPL-MCNC: 8.7 MG/DL (ref 8.6–10.2)
CHLORIDE BLD-SCNC: 93 MEQ/L (ref 98–107)
CO2: 35 MEQ/L (ref 22–29)
CREAT SERPL-MCNC: 0.62 MG/DL (ref 0.5–0.9)
GFR AFRICAN AMERICAN: >60
GFR NON-AFRICAN AMERICAN: >60
GLUCOSE BLD-MCNC: 62 MG/DL (ref 74–109)
POTASSIUM SERPL-SCNC: 4.7 MEQ/L (ref 3.5–5.1)
SODIUM BLD-SCNC: 136 MEQ/L (ref 132–144)
T4 FREE: 1.13 NG/DL (ref 0.93–1.7)
TSH SERPL DL<=0.05 MIU/L-ACNC: 2.44 UIU/ML (ref 0.27–4.2)

## 2018-12-04 ENCOUNTER — HOSPITAL ENCOUNTER (OUTPATIENT)
Dept: NON INVASIVE DIAGNOSTICS | Age: 58
Discharge: HOME | End: 2018-12-04
Payer: MEDICARE

## 2018-12-04 ENCOUNTER — OFFICE VISIT (OUTPATIENT)
Dept: INTERNAL MEDICINE CLINIC | Age: 58
End: 2018-12-04
Payer: MEDICARE

## 2018-12-04 VITALS
OXYGEN SATURATION: 91 % | HEART RATE: 81 BPM | SYSTOLIC BLOOD PRESSURE: 124 MMHG | DIASTOLIC BLOOD PRESSURE: 61 MMHG | TEMPERATURE: 99.2 F

## 2018-12-04 DIAGNOSIS — R21 RASH: Primary | ICD-10-CM

## 2018-12-04 DIAGNOSIS — I89.0 CHRONIC ACQUIRED LYMPHEDEMA: ICD-10-CM

## 2018-12-04 LAB
EKG ATRIAL RATE: 87 BPM
EKG P AXIS: 32 DEGREES
EKG P-R INTERVAL: 128 MS
EKG Q-T INTERVAL: 358 MS
EKG QRS DURATION: 62 MS
EKG QTC CALCULATION (BAZETT): 442 MS
EKG R AXIS: 11 DEGREES
EKG T AXIS: 45 DEGREES
EKG VENTRICULAR RATE: 92 BPM

## 2018-12-04 PROCEDURE — G8427 DOCREV CUR MEDS BY ELIG CLIN: HCPCS | Performed by: FAMILY MEDICINE

## 2018-12-04 PROCEDURE — 1036F TOBACCO NON-USER: CPT | Performed by: FAMILY MEDICINE

## 2018-12-04 PROCEDURE — G8417 CALC BMI ABV UP PARAM F/U: HCPCS | Performed by: FAMILY MEDICINE

## 2018-12-04 PROCEDURE — G8482 FLU IMMUNIZE ORDER/ADMIN: HCPCS | Performed by: FAMILY MEDICINE

## 2018-12-04 PROCEDURE — 93005 ELECTROCARDIOGRAM TRACING: CPT

## 2018-12-04 PROCEDURE — 3017F COLORECTAL CA SCREEN DOC REV: CPT | Performed by: FAMILY MEDICINE

## 2018-12-04 PROCEDURE — 99214 OFFICE O/P EST MOD 30 MIN: CPT | Performed by: FAMILY MEDICINE

## 2018-12-04 RX ORDER — ZINC OXIDE
OINTMENT (GRAM) TOPICAL
Qty: 113 G | Refills: 2 | Status: SHIPPED | OUTPATIENT
Start: 2018-12-04 | End: 2018-12-18 | Stop reason: ALTCHOICE

## 2018-12-04 NOTE — PATIENT INSTRUCTIONS
should I use zinc oxide topical?  Use exactly as directed on the label, or as prescribed by your doctor. Do not use in larger or smaller amounts or for longer than recommended. Do not take by mouth. Topical medicine  is for use only on the skin. A rectal suppository is for use only in your rectum. Apply enough of this medication to cover the entire area to be treated. Zinc oxide often leaves a thin white residue that may not be entirely rubbed in. To treat chapped skin, minor burn wounds, or other skin irritations, use the medication as often as needed. Apply a thin layer to the affected area and rub in gently. To treat diaper rash, use zinc oxide topical each time the diaper is changed. It is especially important to apply the medication at bedtime or whenever there will be a long period of time between diaper changes. Keep the diaper area clean and dry to prevent worsening of skin rash. Change wet diapers as soon as possible. Allow the skin to dry thoroughly before putting on a fresh diaper. When using the powder form of this medicine, pour the powder slowly to avoid a large puff into the air. Do not allow a baby to handle a powder bottle during use. Always close the lid after using the powder. Wash your hands before and after inserting the rectal suppository. Try to empty your bowel and bladder just before using the zinc oxide suppository. Remove the wrapper before inserting the suppository. Avoid handling the suppository too long or it will melt in your hands. Lie on your back with your knees up toward your chest. Gently insert the suppository into your rectum about 1 inch, pointed tip first.  For best results, stay lying down for a few minutes. The suppository will melt quickly and you should feel little or no discomfort while holding it in. Avoid using the bathroom for at least an hour after using the suppository. Wash your hands before and after inserting a rectal suppository.   Try to empty your drug interactions, allergic reactions, or adverse effects. If you have questions about the drugs you are taking, check with your doctor, nurse or pharmacist.  Copyright 8129-9699 [address]. Version: 4.01. Revision date: 4/19/2017. Care instructions adapted under license by [facility] ([facility]). If you have questions about a medical condition or this instruction, always ask your healthcare professional. [place] [address] any warranty or liability for your use of this information.

## 2018-12-06 PROCEDURE — 93010 ELECTROCARDIOGRAM REPORT: CPT | Performed by: INTERNAL MEDICINE

## 2018-12-11 LAB
BASOPHILS ABSOLUTE: 0 K/UL (ref 0–0.2)
BASOPHILS RELATIVE PERCENT: 0.4 %
EOSINOPHILS ABSOLUTE: 0.1 K/UL (ref 0–0.7)
EOSINOPHILS RELATIVE PERCENT: 2.5 %
HCT VFR BLD CALC: 36.3 % (ref 37–47)
HEMOGLOBIN: 12.3 G/DL (ref 12–16)
LYMPHOCYTES ABSOLUTE: 2.1 K/UL (ref 1–4.8)
LYMPHOCYTES RELATIVE PERCENT: 45 %
MCH RBC QN AUTO: 34.5 PG (ref 27–31.3)
MCHC RBC AUTO-ENTMCNC: 33.9 % (ref 33–37)
MCV RBC AUTO: 101.9 FL (ref 82–100)
MONOCYTES ABSOLUTE: 0.7 K/UL (ref 0.2–0.8)
MONOCYTES RELATIVE PERCENT: 14.9 %
NEUTROPHILS ABSOLUTE: 1.7 K/UL (ref 1.4–6.5)
NEUTROPHILS RELATIVE PERCENT: 37.2 %
PDW BLD-RTO: 13.8 % (ref 11.5–14.5)
PLATELET # BLD: 58 K/UL (ref 130–400)
PLATELET SLIDE REVIEW: ABNORMAL
RBC # BLD: 3.56 M/UL (ref 4.2–5.4)
WBC # BLD: 4.6 K/UL (ref 4.8–10.8)

## 2018-12-12 ENCOUNTER — HOSPITAL ENCOUNTER (OUTPATIENT)
Dept: INFUSION THERAPY | Age: 58
Setting detail: INFUSION SERIES
Discharge: HOME | End: 2018-12-12
Payer: MEDICARE

## 2018-12-12 VITALS — TEMPERATURE: 98 F

## 2018-12-12 PROCEDURE — 43760 CHANGE GASTROSTOMY TUBE PERCUTANEOUS W/O GUIDE: CPT | Performed by: SURGERY

## 2018-12-12 PROCEDURE — 99211 OFF/OP EST MAY X REQ PHY/QHP: CPT

## 2018-12-12 NOTE — PROGRESS NOTES
1145. Pt to out patient infusion via w/c per group home caregivers. Dr. Levine changed pt's G-tube (#20 [address]). Dr. Levine changed tube with door closed and requiring no assistance. No noted problem or distress per pt. No requests or questions per caregivers. All equipment used in the care for this patient has been cleaned.

## 2018-12-13 ENCOUNTER — OFFICE VISIT (OUTPATIENT)
Dept: INTERNAL MEDICINE CLINIC | Age: 58
End: 2018-12-13
Payer: MEDICARE

## 2018-12-13 VITALS — HEART RATE: 97 BPM | RESPIRATION RATE: 14 BRPM

## 2018-12-13 DIAGNOSIS — R60.0 PEDAL EDEMA: ICD-10-CM

## 2018-12-13 DIAGNOSIS — S80.12XA TRAUMATIC ECCHYMOSIS OF LEFT LOWER LEG, INITIAL ENCOUNTER: ICD-10-CM

## 2018-12-13 DIAGNOSIS — S80.11XA CONTUSION OF RIGHT LOWER EXTREMITY, INITIAL ENCOUNTER: Primary | ICD-10-CM

## 2018-12-13 PROCEDURE — G8427 DOCREV CUR MEDS BY ELIG CLIN: HCPCS | Performed by: FAMILY MEDICINE

## 2018-12-13 PROCEDURE — 1036F TOBACCO NON-USER: CPT | Performed by: FAMILY MEDICINE

## 2018-12-13 PROCEDURE — G8482 FLU IMMUNIZE ORDER/ADMIN: HCPCS | Performed by: FAMILY MEDICINE

## 2018-12-13 PROCEDURE — 3017F COLORECTAL CA SCREEN DOC REV: CPT | Performed by: FAMILY MEDICINE

## 2018-12-13 PROCEDURE — 99213 OFFICE O/P EST LOW 20 MIN: CPT | Performed by: FAMILY MEDICINE

## 2018-12-13 PROCEDURE — G8417 CALC BMI ABV UP PARAM F/U: HCPCS | Performed by: FAMILY MEDICINE

## 2018-12-13 RX ORDER — FUROSEMIDE 20 MG/1
20 TABLET ORAL 2 TIMES DAILY
Qty: 60 TABLET | Refills: 1 | Status: SHIPPED | OUTPATIENT
Start: 2018-12-13 | End: 2019-03-06 | Stop reason: SDUPTHER

## 2018-12-13 NOTE — OP NOTE
[address]                      [address], [address] [facility]                                OPERATIVE REPORT    PATIENT NAME: Henry Spatz                       :        1960  MED REC NO:   [id number]                            ROOM:  ACCOUNT NO:   [de-identified]                           ADMIT DATE: 2018  PROVIDER:     Angela Chase MD    DATE OF PROCEDURE:  2018    PREOPERATIVE DIAGNOSES:  Mechanical complication of gastrostomy tube and  chronic dysphagia. POSTOPERATIVE DIAGNOSES:  Mechanical complication of gastrostomy tube  and chronic dysphagia. OPERATION PERFORMED:  Change of G tube. SURGEON:  Angela Chase MD.    ANESTHESIA:  None. EBL:  Minimal.    CLINICAL NOTE:  This woman receives her medication and nutritional  support through G tube. The plastic is starting to wear out and  breakdown, and the tube needs replaced. OPERATIVE PROCEDURE:  The procedure was done in the outpatient infusion  area with the patient in her wheelchair. The gastrostomy is present  high in the epigastric area. The tube was removed with traction, but  the disc broke off from the stem. A new 20-Hebrew disc style tube was  lubricated and inserted through the tract uneventfully. The tube  flushed and return was noted. The site was dressed with gauze and tape. She left the suite in good condition and we will recheck in six months.         Elizabeth Swift MD    D: 2018 12:32:25       T: 2018 12:36:07     [email or web address]  Job#: [id number]     Doc#: [id number]    CC:

## 2018-12-13 NOTE — PROGRESS NOTES
 Comprehensive Metabolic Panel     Standing Status:   Future     Standing Expiration Date:   2019     Orders Placed This Encounter   Medications    furosemide (LASIX) 20 MG tablet     Si tablet by PEG Tube route 2 times daily     Dispense:  60 tablet     Refill:  1       Return in about 1 week (around 2018), or if symptoms worsen or fail to improve.

## 2018-12-14 ENCOUNTER — OFFICE VISIT (OUTPATIENT)
Dept: ENDOCRINOLOGY | Age: 58
End: 2018-12-14
Payer: MEDICARE

## 2018-12-14 VITALS
HEART RATE: 145 BPM | OXYGEN SATURATION: 91 % | BODY MASS INDEX: 36.07 KG/M2 | SYSTOLIC BLOOD PRESSURE: 103 MMHG | DIASTOLIC BLOOD PRESSURE: 70 MMHG | WEIGHT: 196 LBS | HEIGHT: 62 IN

## 2018-12-14 DIAGNOSIS — E03.9 HYPOTHYROIDISM, UNSPECIFIED TYPE: Primary | ICD-10-CM

## 2018-12-14 PROCEDURE — G8482 FLU IMMUNIZE ORDER/ADMIN: HCPCS | Performed by: INTERNAL MEDICINE

## 2018-12-14 PROCEDURE — 3017F COLORECTAL CA SCREEN DOC REV: CPT | Performed by: INTERNAL MEDICINE

## 2018-12-14 PROCEDURE — 99213 OFFICE O/P EST LOW 20 MIN: CPT | Performed by: INTERNAL MEDICINE

## 2018-12-14 PROCEDURE — 1036F TOBACCO NON-USER: CPT | Performed by: INTERNAL MEDICINE

## 2018-12-14 PROCEDURE — G8427 DOCREV CUR MEDS BY ELIG CLIN: HCPCS | Performed by: INTERNAL MEDICINE

## 2018-12-14 PROCEDURE — G8417 CALC BMI ABV UP PARAM F/U: HCPCS | Performed by: INTERNAL MEDICINE

## 2018-12-14 RX ORDER — LEVOTHYROXINE SODIUM 0.03 MG/1
25 TABLET ORAL DAILY
Qty: 90 TABLET | Refills: 3 | Status: SHIPPED | OUTPATIENT
Start: 2018-12-14 | End: 2019-06-14 | Stop reason: SDUPTHER

## 2018-12-16 ASSESSMENT — ENCOUNTER SYMPTOMS: COLOR CHANGE: 1

## 2018-12-18 ENCOUNTER — OFFICE VISIT (OUTPATIENT)
Dept: PULMONOLOGY | Age: 58
End: 2018-12-18
Payer: MEDICARE

## 2018-12-18 VITALS
SYSTOLIC BLOOD PRESSURE: 134 MMHG | OXYGEN SATURATION: 94 % | RESPIRATION RATE: 16 BRPM | WEIGHT: 196 LBS | BODY MASS INDEX: 36.07 KG/M2 | HEIGHT: 62 IN | DIASTOLIC BLOOD PRESSURE: 80 MMHG | TEMPERATURE: 97.9 F | HEART RATE: 86 BPM

## 2018-12-18 DIAGNOSIS — G47.33 OSA (OBSTRUCTIVE SLEEP APNEA): ICD-10-CM

## 2018-12-18 DIAGNOSIS — R09.02 HYPOXIA: Primary | ICD-10-CM

## 2018-12-18 DIAGNOSIS — E66.9 OBESITY (BMI 30-39.9): ICD-10-CM

## 2018-12-18 LAB
ANION GAP SERPL CALCULATED.3IONS-SCNC: 7 MEQ/L (ref 7–13)
BASOPHILS ABSOLUTE: 0 K/UL (ref 0–0.2)
BASOPHILS RELATIVE PERCENT: 0.4 %
BUN BLDV-MCNC: 15 MG/DL (ref 6–20)
CALCIUM SERPL-MCNC: 9 MG/DL (ref 8.6–10.2)
CHLORIDE BLD-SCNC: 91 MEQ/L (ref 98–107)
CO2: 38 MEQ/L (ref 22–29)
CREAT SERPL-MCNC: 0.81 MG/DL (ref 0.5–0.9)
EOSINOPHILS ABSOLUTE: 0.1 K/UL (ref 0–0.7)
EOSINOPHILS RELATIVE PERCENT: 2.4 %
GFR AFRICAN AMERICAN: >60
GFR NON-AFRICAN AMERICAN: >60
GLUCOSE BLD-MCNC: 67 MG/DL (ref 74–109)
HCT VFR BLD CALC: 37.8 % (ref 37–47)
HEMOGLOBIN: 12.8 G/DL (ref 12–16)
LYMPHOCYTES ABSOLUTE: 1.9 K/UL (ref 1–4.8)
LYMPHOCYTES RELATIVE PERCENT: 37.6 %
MCH RBC QN AUTO: 34.6 PG (ref 27–31.3)
MCHC RBC AUTO-ENTMCNC: 33.9 % (ref 33–37)
MCV RBC AUTO: 101.8 FL (ref 82–100)
MONOCYTES ABSOLUTE: 0.9 K/UL (ref 0.2–0.8)
MONOCYTES RELATIVE PERCENT: 17.1 %
NEUTROPHILS ABSOLUTE: 2.2 K/UL (ref 1.4–6.5)
NEUTROPHILS RELATIVE PERCENT: 42.5 %
PDW BLD-RTO: 13.7 % (ref 11.5–14.5)
PLATELET # BLD: 53 K/UL (ref 130–400)
POTASSIUM SERPL-SCNC: 4.9 MEQ/L (ref 3.5–5.1)
RBC # BLD: 3.71 M/UL (ref 4.2–5.4)
SODIUM BLD-SCNC: 136 MEQ/L (ref 132–144)
WBC # BLD: 5.2 K/UL (ref 4.8–10.8)

## 2018-12-18 PROCEDURE — 99214 OFFICE O/P EST MOD 30 MIN: CPT | Performed by: INTERNAL MEDICINE

## 2018-12-18 PROCEDURE — G8417 CALC BMI ABV UP PARAM F/U: HCPCS | Performed by: INTERNAL MEDICINE

## 2018-12-18 PROCEDURE — 1036F TOBACCO NON-USER: CPT | Performed by: INTERNAL MEDICINE

## 2018-12-18 PROCEDURE — G8482 FLU IMMUNIZE ORDER/ADMIN: HCPCS | Performed by: INTERNAL MEDICINE

## 2018-12-18 PROCEDURE — G8427 DOCREV CUR MEDS BY ELIG CLIN: HCPCS | Performed by: INTERNAL MEDICINE

## 2018-12-18 PROCEDURE — 3017F COLORECTAL CA SCREEN DOC REV: CPT | Performed by: INTERNAL MEDICINE

## 2018-12-18 RX ORDER — CLOZAPINE 25 MG/1
25 TABLET ORAL 2 TIMES DAILY
COMMUNITY
End: 2019-09-19

## 2018-12-18 RX ORDER — VALPROIC ACID 250 MG/1
250 CAPSULE, LIQUID FILLED ORAL 4 TIMES DAILY
COMMUNITY

## 2018-12-18 ASSESSMENT — ENCOUNTER SYMPTOMS
VOMITING: 0
RHINORRHEA: 0
VOICE CHANGE: 0
DIARRHEA: 0
SINUS PRESSURE: 0
EYE ITCHING: 0
WHEEZING: 1
NAUSEA: 0
ABDOMINAL PAIN: 0
TROUBLE SWALLOWING: 0
COUGH: 0
SORE THROAT: 0
CHEST TIGHTNESS: 0
SHORTNESS OF BREATH: 1
EYE DISCHARGE: 0

## 2018-12-19 ENCOUNTER — OFFICE VISIT (OUTPATIENT)
Dept: INTERNAL MEDICINE CLINIC | Age: 58
End: 2018-12-19
Payer: MEDICARE

## 2018-12-19 VITALS
HEART RATE: 67 BPM | HEIGHT: 62 IN | OXYGEN SATURATION: 89 % | BODY MASS INDEX: 35.85 KG/M2 | DIASTOLIC BLOOD PRESSURE: 71 MMHG | RESPIRATION RATE: 14 BRPM | TEMPERATURE: 98 F | SYSTOLIC BLOOD PRESSURE: 100 MMHG

## 2018-12-19 DIAGNOSIS — L03.116 CELLULITIS OF LEFT LOWER EXTREMITY: Primary | ICD-10-CM

## 2018-12-19 DIAGNOSIS — S80.12XS CONTUSION OF LEFT LOWER EXTREMITY, SEQUELA: ICD-10-CM

## 2018-12-19 DIAGNOSIS — R60.0 PEDAL EDEMA: ICD-10-CM

## 2018-12-19 PROCEDURE — 1036F TOBACCO NON-USER: CPT | Performed by: FAMILY MEDICINE

## 2018-12-19 PROCEDURE — G8417 CALC BMI ABV UP PARAM F/U: HCPCS | Performed by: FAMILY MEDICINE

## 2018-12-19 PROCEDURE — 99213 OFFICE O/P EST LOW 20 MIN: CPT | Performed by: FAMILY MEDICINE

## 2018-12-19 PROCEDURE — G8482 FLU IMMUNIZE ORDER/ADMIN: HCPCS | Performed by: FAMILY MEDICINE

## 2018-12-19 PROCEDURE — 3017F COLORECTAL CA SCREEN DOC REV: CPT | Performed by: FAMILY MEDICINE

## 2018-12-19 PROCEDURE — G8427 DOCREV CUR MEDS BY ELIG CLIN: HCPCS | Performed by: FAMILY MEDICINE

## 2018-12-19 RX ORDER — SPIRONOLACTONE 25 MG/1
12.5 TABLET ORAL DAILY
Qty: 15 TABLET | Refills: 0 | Status: SHIPPED | OUTPATIENT
Start: 2018-12-19 | End: 2019-03-04

## 2018-12-19 RX ORDER — ASCORBIC ACID 500 MG
500 TABLET ORAL DAILY
Qty: 90 TABLET | Refills: 0 | Status: SHIPPED | OUTPATIENT
Start: 2018-12-19 | End: 2019-02-05 | Stop reason: ALTCHOICE

## 2018-12-19 NOTE — PATIENT INSTRUCTIONS
Patient Education        Leg and Ankle Edema: Care Instructions  Your Care Instructions  Swelling in the legs, ankles, and feet is called edema. It is common after you sit or stand for a while. Long plane flights or car rides often cause swelling in the legs and feet. You may also have swelling if you have to stand for long periods of time at your job. Problems with the veins in the legs (varicose veins) and changes in hormones can also cause swelling. Sometimes the swelling in the ankles and feet is caused by a more serious problem, such as heart failure, infection, blood clots, or liver or kidney disease. Follow-up care is a key part of your treatment and safety. Be sure to make and go to all appointments, and call your doctor if you are having problems. It's also a good idea to know your test results and keep a list of the medicines you take. How can you care for yourself at home? · If your doctor gave you medicine, take it as prescribed. Call your doctor if you think you are having a problem with your medicine. · Whenever you are resting, raise your legs up. Try to keep the swollen area higher than the level of your heart. · Take breaks from standing or sitting in one position. ? Walk around to increase the blood flow in your lower legs. ? Move your feet and ankles often while you stand, or tighten and relax your leg muscles. · Wear support stockings. Put them on in the morning, before swelling gets worse. · Eat a balanced diet. Lose weight if you need to. · Limit the amount of salt (sodium) in your diet. Salt holds fluid in the body and may increase swelling. When should you call for help? Call 911 anytime you think you may need emergency care. For example, call if:    · You have symptoms of a blood clot in your lung (called a pulmonary embolism). These may include:  ? Sudden chest pain. ? Trouble breathing. ?  Coughing up blood.    Call your doctor now or seek immediate medical care if:    · You

## 2018-12-21 LAB
ALBUMIN SERPL-MCNC: 3.1 G/DL (ref 3.9–4.9)
ALP BLD-CCNC: 77 U/L (ref 40–130)
ALT SERPL-CCNC: 8 U/L (ref 0–33)
ANION GAP SERPL CALCULATED.3IONS-SCNC: 6 MEQ/L (ref 7–13)
AST SERPL-CCNC: 35 U/L (ref 0–35)
BILIRUB SERPL-MCNC: 0.3 MG/DL (ref 0–1.2)
BUN BLDV-MCNC: 16 MG/DL (ref 6–20)
CALCIUM SERPL-MCNC: 9.1 MG/DL (ref 8.6–10.2)
CHLORIDE BLD-SCNC: 89 MEQ/L (ref 98–107)
CO2: 37 MEQ/L (ref 22–29)
CREAT SERPL-MCNC: 0.74 MG/DL (ref 0.5–0.9)
GFR AFRICAN AMERICAN: >60
GFR NON-AFRICAN AMERICAN: >60
GLOBULIN: 3.5 G/DL (ref 2.3–3.5)
GLUCOSE BLD-MCNC: 68 MG/DL (ref 74–109)
POTASSIUM SERPL-SCNC: 4.9 MEQ/L (ref 3.5–5.1)
SODIUM BLD-SCNC: 132 MEQ/L (ref 132–144)
TOTAL PROTEIN: 6.6 G/DL (ref 6.4–8.1)

## 2018-12-23 LAB
ALBUMIN SERPL-MCNC: 3.3 G/DL (ref 3.9–4.9)
ALP BLD-CCNC: 90 U/L (ref 40–130)
ALT SERPL-CCNC: 9 U/L (ref 0–33)
ANION GAP SERPL CALCULATED.3IONS-SCNC: 9 MEQ/L (ref 7–13)
AST SERPL-CCNC: 26 U/L (ref 0–35)
BILIRUB SERPL-MCNC: 0.4 MG/DL (ref 0–1.2)
BUN BLDV-MCNC: 14 MG/DL (ref 6–20)
CALCIUM SERPL-MCNC: 9.2 MG/DL (ref 8.6–10.2)
CHLORIDE BLD-SCNC: 86 MEQ/L (ref 98–107)
CO2: 36 MEQ/L (ref 22–29)
CREAT SERPL-MCNC: 0.78 MG/DL (ref 0.5–0.9)
GFR AFRICAN AMERICAN: >60
GFR NON-AFRICAN AMERICAN: >60
GLOBULIN: 3.7 G/DL (ref 2.3–3.5)
GLUCOSE BLD-MCNC: 82 MG/DL (ref 74–109)
POTASSIUM SERPL-SCNC: 4.8 MEQ/L (ref 3.5–5.1)
SODIUM BLD-SCNC: 131 MEQ/L (ref 132–144)
TOTAL PROTEIN: 7 G/DL (ref 6.4–8.1)

## 2019-01-02 ENCOUNTER — OFFICE VISIT (OUTPATIENT)
Dept: INTERNAL MEDICINE CLINIC | Age: 59
End: 2019-01-02
Payer: MEDICARE

## 2019-01-02 VITALS
SYSTOLIC BLOOD PRESSURE: 110 MMHG | DIASTOLIC BLOOD PRESSURE: 80 MMHG | BODY MASS INDEX: 36.07 KG/M2 | HEART RATE: 76 BPM | RESPIRATION RATE: 16 BRPM | OXYGEN SATURATION: 95 % | HEIGHT: 62 IN | WEIGHT: 196 LBS

## 2019-01-02 DIAGNOSIS — R60.0 PEDAL EDEMA: Primary | ICD-10-CM

## 2019-01-02 DIAGNOSIS — L03.116 CELLULITIS OF LEFT LOWER EXTREMITY: ICD-10-CM

## 2019-01-02 PROCEDURE — 3017F COLORECTAL CA SCREEN DOC REV: CPT | Performed by: FAMILY MEDICINE

## 2019-01-02 PROCEDURE — G8482 FLU IMMUNIZE ORDER/ADMIN: HCPCS | Performed by: FAMILY MEDICINE

## 2019-01-02 PROCEDURE — 1036F TOBACCO NON-USER: CPT | Performed by: FAMILY MEDICINE

## 2019-01-02 PROCEDURE — G8417 CALC BMI ABV UP PARAM F/U: HCPCS | Performed by: FAMILY MEDICINE

## 2019-01-02 PROCEDURE — G8427 DOCREV CUR MEDS BY ELIG CLIN: HCPCS | Performed by: FAMILY MEDICINE

## 2019-01-02 PROCEDURE — 99213 OFFICE O/P EST LOW 20 MIN: CPT | Performed by: FAMILY MEDICINE

## 2019-01-02 ASSESSMENT — ENCOUNTER SYMPTOMS
SHORTNESS OF BREATH: 0
COUGH: 0

## 2019-01-25 LAB
ALBUMIN SERPL-MCNC: 3 G/DL (ref 3.9–4.9)
ANION GAP SERPL CALCULATED.3IONS-SCNC: 5 MEQ/L (ref 7–13)
BUN BLDV-MCNC: 21 MG/DL (ref 6–20)
CALCIUM SERPL-MCNC: 9 MG/DL (ref 8.6–10.2)
CHLORIDE BLD-SCNC: 91 MEQ/L (ref 98–107)
CO2: 39 MEQ/L (ref 22–29)
CREAT SERPL-MCNC: 0.81 MG/DL (ref 0.5–0.9)
GFR AFRICAN AMERICAN: >60
GFR NON-AFRICAN AMERICAN: >60
GLUCOSE BLD-MCNC: 64 MG/DL (ref 74–109)
PHOSPHORUS: 4.2 MG/DL (ref 2.5–4.5)
POTASSIUM SERPL-SCNC: 5.5 MEQ/L (ref 3.5–5.1)
PREALBUMIN: 21.6 MG/DL (ref 20–40)
SODIUM BLD-SCNC: 135 MEQ/L (ref 132–144)

## 2019-02-05 ENCOUNTER — OFFICE VISIT (OUTPATIENT)
Dept: INTERNAL MEDICINE CLINIC | Age: 59
End: 2019-02-05
Payer: MEDICARE

## 2019-02-05 VITALS
DIASTOLIC BLOOD PRESSURE: 93 MMHG | TEMPERATURE: 98.7 F | OXYGEN SATURATION: 91 % | RESPIRATION RATE: 14 BRPM | SYSTOLIC BLOOD PRESSURE: 134 MMHG | HEART RATE: 88 BPM

## 2019-02-05 DIAGNOSIS — I50.30 DIASTOLIC HEART FAILURE, NYHA CLASS 1 (HCC): Primary | ICD-10-CM

## 2019-02-05 DIAGNOSIS — D75.89 MACROCYTOSIS WITHOUT ANEMIA: ICD-10-CM

## 2019-02-05 DIAGNOSIS — R60.0 PEDAL EDEMA: ICD-10-CM

## 2019-02-05 DIAGNOSIS — E87.5 HYPERKALEMIA: ICD-10-CM

## 2019-02-05 DIAGNOSIS — D69.6 THROMBOCYTOPENIA (HCC): ICD-10-CM

## 2019-02-05 PROCEDURE — 1036F TOBACCO NON-USER: CPT | Performed by: FAMILY MEDICINE

## 2019-02-05 PROCEDURE — 99214 OFFICE O/P EST MOD 30 MIN: CPT | Performed by: FAMILY MEDICINE

## 2019-02-05 PROCEDURE — 3017F COLORECTAL CA SCREEN DOC REV: CPT | Performed by: FAMILY MEDICINE

## 2019-02-05 PROCEDURE — G8417 CALC BMI ABV UP PARAM F/U: HCPCS | Performed by: FAMILY MEDICINE

## 2019-02-05 PROCEDURE — G8427 DOCREV CUR MEDS BY ELIG CLIN: HCPCS | Performed by: FAMILY MEDICINE

## 2019-02-05 PROCEDURE — G8482 FLU IMMUNIZE ORDER/ADMIN: HCPCS | Performed by: FAMILY MEDICINE

## 2019-02-05 ASSESSMENT — ENCOUNTER SYMPTOMS
ABDOMINAL PAIN: 0
VOMITING: 0
COUGH: 0
DIARRHEA: 0
NAUSEA: 0
CONSTIPATION: 0

## 2019-02-19 LAB
ALBUMIN SERPL-MCNC: 2.8 G/DL (ref 3.5–4.6)
ANION GAP SERPL CALCULATED.3IONS-SCNC: 9 MEQ/L (ref 9–15)
BASOPHILS ABSOLUTE: 0 K/UL (ref 0–0.2)
BASOPHILS RELATIVE PERCENT: 0.3 %
BUN BLDV-MCNC: 22 MG/DL (ref 6–20)
CALCIUM SERPL-MCNC: 8.8 MG/DL (ref 8.5–9.9)
CHLORIDE BLD-SCNC: 92 MEQ/L (ref 95–107)
CO2: 36 MEQ/L (ref 20–31)
CREAT SERPL-MCNC: 0.76 MG/DL (ref 0.5–0.9)
EOSINOPHILS ABSOLUTE: 0.1 K/UL (ref 0–0.7)
EOSINOPHILS RELATIVE PERCENT: 1.6 %
FOLATE: >20 NG/ML (ref 7.3–26.1)
GFR AFRICAN AMERICAN: >60
GFR NON-AFRICAN AMERICAN: >60
GLUCOSE BLD-MCNC: 66 MG/DL (ref 70–99)
HCT VFR BLD CALC: 39.5 % (ref 37–47)
HEMOGLOBIN: 13.2 G/DL (ref 12–16)
LYMPHOCYTES ABSOLUTE: 1.8 K/UL (ref 1–4.8)
LYMPHOCYTES RELATIVE PERCENT: 36.3 %
MCH RBC QN AUTO: 34 PG (ref 27–31.3)
MCHC RBC AUTO-ENTMCNC: 33.3 % (ref 33–37)
MCV RBC AUTO: 101.9 FL (ref 82–100)
MONOCYTES ABSOLUTE: 0.8 K/UL (ref 0.2–0.8)
MONOCYTES RELATIVE PERCENT: 15.5 %
NEUTROPHILS ABSOLUTE: 2.3 K/UL (ref 1.4–6.5)
NEUTROPHILS RELATIVE PERCENT: 46.3 %
PDW BLD-RTO: 13.3 % (ref 11.5–14.5)
PHOSPHORUS: 4 MG/DL (ref 2.3–4.8)
PLATELET # BLD: 68 K/UL (ref 130–400)
POTASSIUM SERPL-SCNC: 4.5 MEQ/L (ref 3.4–4.9)
RBC # BLD: 3.87 M/UL (ref 4.2–5.4)
SODIUM BLD-SCNC: 137 MEQ/L (ref 135–144)
VITAMIN B-12: 1178 PG/ML (ref 232–1245)
WBC # BLD: 4.9 K/UL (ref 4.8–10.8)

## 2019-03-04 ENCOUNTER — OFFICE VISIT (OUTPATIENT)
Dept: INTERNAL MEDICINE CLINIC | Age: 59
End: 2019-03-04
Payer: MEDICARE

## 2019-03-04 VITALS
WEIGHT: 196 LBS | HEART RATE: 86 BPM | TEMPERATURE: 97 F | BODY MASS INDEX: 36.07 KG/M2 | HEIGHT: 62 IN | DIASTOLIC BLOOD PRESSURE: 66 MMHG | SYSTOLIC BLOOD PRESSURE: 95 MMHG | OXYGEN SATURATION: 91 %

## 2019-03-04 DIAGNOSIS — I89.0 LYMPHEDEMA: Primary | ICD-10-CM

## 2019-03-04 DIAGNOSIS — K59.00 CONSTIPATION, UNSPECIFIED CONSTIPATION TYPE: ICD-10-CM

## 2019-03-04 DIAGNOSIS — Z93.1 G TUBE FEEDINGS (HCC): ICD-10-CM

## 2019-03-04 PROCEDURE — G8482 FLU IMMUNIZE ORDER/ADMIN: HCPCS | Performed by: INTERNAL MEDICINE

## 2019-03-04 PROCEDURE — 1036F TOBACCO NON-USER: CPT | Performed by: INTERNAL MEDICINE

## 2019-03-04 PROCEDURE — 3017F COLORECTAL CA SCREEN DOC REV: CPT | Performed by: INTERNAL MEDICINE

## 2019-03-04 PROCEDURE — G8427 DOCREV CUR MEDS BY ELIG CLIN: HCPCS | Performed by: INTERNAL MEDICINE

## 2019-03-04 PROCEDURE — 99213 OFFICE O/P EST LOW 20 MIN: CPT | Performed by: INTERNAL MEDICINE

## 2019-03-04 PROCEDURE — G8417 CALC BMI ABV UP PARAM F/U: HCPCS | Performed by: INTERNAL MEDICINE

## 2019-03-05 ENCOUNTER — CLINICAL DOCUMENTATION (OUTPATIENT)
Dept: PHYSICAL THERAPY | Age: 59
End: 2019-03-05

## 2019-03-05 LAB
BASOPHILS ABSOLUTE: 0 K/UL (ref 0–0.2)
BASOPHILS RELATIVE PERCENT: 0.3 %
EOSINOPHILS ABSOLUTE: 0.1 K/UL (ref 0–0.7)
EOSINOPHILS RELATIVE PERCENT: 1 %
HCT VFR BLD CALC: 38.1 % (ref 37–47)
HEMOGLOBIN: 13 G/DL (ref 12–16)
LYMPHOCYTES ABSOLUTE: 2.1 K/UL (ref 1–4.8)
LYMPHOCYTES RELATIVE PERCENT: 36.4 %
MCH RBC QN AUTO: 34.6 PG (ref 27–31.3)
MCHC RBC AUTO-ENTMCNC: 34.1 % (ref 33–37)
MCV RBC AUTO: 101.3 FL (ref 82–100)
MONOCYTES ABSOLUTE: 0.9 K/UL (ref 0.2–0.8)
MONOCYTES RELATIVE PERCENT: 15.4 %
NEUTROPHILS ABSOLUTE: 2.7 K/UL (ref 1.4–6.5)
NEUTROPHILS RELATIVE PERCENT: 46.9 %
PDW BLD-RTO: 13 % (ref 11.5–14.5)
PLATELET # BLD: 68 K/UL (ref 130–400)
PLATELET SLIDE REVIEW: ABNORMAL
RBC # BLD: 3.76 M/UL (ref 4.2–5.4)
SLIDE REVIEW: ABNORMAL
WBC # BLD: 5.8 K/UL (ref 4.8–10.8)

## 2019-03-06 DIAGNOSIS — R60.0 PEDAL EDEMA: ICD-10-CM

## 2019-03-06 RX ORDER — FUROSEMIDE 20 MG/1
20 TABLET ORAL DAILY
Qty: 30 TABLET | Refills: 2 | Status: SHIPPED | OUTPATIENT
Start: 2019-03-06

## 2019-03-06 RX ORDER — SPIRONOLACTONE 25 MG/1
12.5 TABLET ORAL DAILY
Qty: 15 TABLET | Refills: 3 | Status: SHIPPED | OUTPATIENT
Start: 2019-03-06 | End: 2019-03-12

## 2019-03-12 ENCOUNTER — OFFICE VISIT (OUTPATIENT)
Dept: FAMILY MEDICINE CLINIC | Age: 59
End: 2019-03-12
Payer: MEDICARE

## 2019-03-12 VITALS
HEART RATE: 77 BPM | BODY MASS INDEX: 35.12 KG/M2 | TEMPERATURE: 98.6 F | SYSTOLIC BLOOD PRESSURE: 134 MMHG | WEIGHT: 192 LBS | DIASTOLIC BLOOD PRESSURE: 84 MMHG | OXYGEN SATURATION: 97 %

## 2019-03-12 DIAGNOSIS — J06.9 VIRAL URI: Primary | ICD-10-CM

## 2019-03-12 PROCEDURE — 1036F TOBACCO NON-USER: CPT | Performed by: NURSE PRACTITIONER

## 2019-03-12 PROCEDURE — G8482 FLU IMMUNIZE ORDER/ADMIN: HCPCS | Performed by: NURSE PRACTITIONER

## 2019-03-12 PROCEDURE — 3017F COLORECTAL CA SCREEN DOC REV: CPT | Performed by: NURSE PRACTITIONER

## 2019-03-12 PROCEDURE — G8417 CALC BMI ABV UP PARAM F/U: HCPCS | Performed by: NURSE PRACTITIONER

## 2019-03-12 PROCEDURE — G8427 DOCREV CUR MEDS BY ELIG CLIN: HCPCS | Performed by: NURSE PRACTITIONER

## 2019-03-12 PROCEDURE — 99213 OFFICE O/P EST LOW 20 MIN: CPT | Performed by: NURSE PRACTITIONER

## 2019-03-12 ASSESSMENT — ENCOUNTER SYMPTOMS
SHORTNESS OF BREATH: 0
RHINORRHEA: 0
COUGH: 1
WHEEZING: 0

## 2019-04-01 ASSESSMENT — ENCOUNTER SYMPTOMS
BLOOD IN STOOL: 0
COUGH: 0
DIARRHEA: 0
HEMATOCHEZIA: 0
CONSTIPATION: 1

## 2019-04-01 NOTE — PROGRESS NOTES
Treva Garza is a 60 y.o. female with intellectual disability, psychiatric illness, PEG status due to pulmonary aspiration, obesity with hypoventilation, resident of a group home, who presents with     Chief Complaint   Patient presents with    Edema  Bilateral lower extremity lymphedema has been slightly worse due to patient's immobility     Constipation       Interim history: Since her last office visit with me 4 months ago, the patient has seen my colleagues several times in the office, had no emergency room visits or hospital admissions. Medication refills requests were received by the office and done electronically. Monthly orders for the skilled nursing were reviewed and signed in the interim. The following laboratory reports since the past visit were reviewed (the ones pertinent to today's visit were discussed with the patient/ caregiver):    Orders Only on 02/19/2019   Component Date Value Ref Range Status    Vitamin B-12 02/19/2019 1178  232 - 1245 pg/mL Final    Folate 02/19/2019 >20.0  7.3 - 26.1 ng/mL Final    Comment: As of 8/10/16, the methodology has changed. Results from  this methodology should not be compared with results from  previous methodology. Orders Only on 02/19/2019   Component Date Value Ref Range Status    Sodium 02/19/2019 137  135 - 144 mEq/L Final    Comment: Effective:  2/7/2019  New reference range for this analyte has been established.  Potassium 02/19/2019 4.5  3.4 - 4.9 mEq/L Final    Comment: Effective:  2/7/2019  New reference range for this analyte has been established.  Chloride 02/19/2019 92* 95 - 107 mEq/L Final    Comment: Effective:  2/7/2019  New reference range for this analyte has been established.  CO2 02/19/2019 36* 20 - 31 mEq/L Final    Comment: Effective:  2/7/2019  New reference range for this analyte has been established.       Anion Gap 02/19/2019 9  9 - 15 mEq/L Final    Comment: Effective:  2/7/2019  New reference range for this  Monocytes # 02/19/2019 0.8  0.2 - 0.8 K/uL Final    Eosinophils # 02/19/2019 0.1  0.0 - 0.7 K/uL Final    Basophils # 02/19/2019 0.0  0.0 - 0.2 K/uL Final   Orders Only on 01/29/2019   Component Date Value Ref Range Status    WBC 01/29/2019 5.0  4.8 - 10.8 K/uL Final    RBC 01/29/2019 3.71* 4.20 - 5.40 M/uL Final    Hemoglobin 01/29/2019 13.2  12.0 - 16.0 g/dL Final    Hematocrit 01/29/2019 37.4  37.0 - 47.0 % Final    MCV 01/29/2019 100.7* 82.0 - 100.0 fL Final    MCH 01/29/2019 35.5* 27.0 - 31.3 pg Final    MCHC 01/29/2019 35.3  33.0 - 37.0 % Final    RDW 01/29/2019 12.8  11.5 - 14.5 % Final    Platelets 91/23/7622 67* 130 - 400 K/uL Final    PLATELET SLIDE REVIEW 01/29/2019 Decreased   Final    SLIDE REVIEW 01/29/2019 see below   Final    Slide review agrees with reported results    Neutrophils % 01/29/2019 44.5  % Final    Lymphocytes % 01/29/2019 38.7  % Final    Monocytes % 01/29/2019 14.0  % Final    Eosinophils % 01/29/2019 2.3  % Final    Basophils % 01/29/2019 0.5  % Final    Neutrophils # 01/29/2019 2.2  1.4 - 6.5 K/uL Final    Lymphocytes # 01/29/2019 2.0  1.0 - 4.8 K/uL Final    Monocytes # 01/29/2019 0.7  0.2 - 0.8 K/uL Final    Eosinophils # 01/29/2019 0.1  0.0 - 0.7 K/uL Final    Basophils # 01/29/2019 0.0  0.0 - 0.2 K/uL Final   Orders Only on 01/25/2019   Component Date Value Ref Range Status    Sodium 01/25/2019 135  132 - 144 mEq/L Final    Potassium 01/25/2019 5.5* 3.5 - 5.1 mEq/L Final    Chloride 01/25/2019 91* 98 - 107 mEq/L Final    CO2 01/25/2019 39* 22 - 29 mEq/L Final    Anion Gap 01/25/2019 5* 7 - 13 mEq/L Final    Glucose 01/25/2019 64* 74 - 109 mg/dL Final    BUN 01/25/2019 21* 6 - 20 mg/dL Final    CREATININE 01/25/2019 0.81  0.50 - 0.90 mg/dL Final    GFR Non- 01/25/2019 >60.0  >60 Final    Comment: >60 mL/min/1.73m2 EGFR, calc. for ages 25 and older using the  MDRD formula (not corrected for weight), is valid for stable  renal function.  GFR  01/25/2019 >60.0  >60 Final    Comment: >60 mL/min/1.73m2 EGFR, calc. for ages 25 and older using the  MDRD formula (not corrected for weight), is valid for stable  renal function.       Calcium 01/25/2019 9.0  8.6 - 10.2 mg/dL Final    Phosphorus 01/25/2019 4.2  2.5 - 4.5 mg/dL Final    Alb 01/25/2019 3.0* 3.9 - 4.9 g/dL Final   Orders Only on 01/25/2019   Component Date Value Ref Range Status    Prealbumin 01/25/2019 21.6  20.0 - 40.0 mg/dL Final   Orders Only on 01/15/2019   Component Date Value Ref Range Status    WBC 01/15/2019 4.8  4.8 - 10.8 K/uL Final    RBC 01/15/2019 3.79* 4.20 - 5.40 M/uL Final    Hemoglobin 01/15/2019 13.4  12.0 - 16.0 g/dL Final    Hematocrit 01/15/2019 38.2  37.0 - 47.0 % Final    MCV 01/15/2019 100.9* 82.0 - 100.0 fL Final    MCH 01/15/2019 35.5* 27.0 - 31.3 pg Final    MCHC 01/15/2019 35.2  33.0 - 37.0 % Final    RDW 01/15/2019 13.4  11.5 - 14.5 % Final    Platelets 06/48/9553 57* 130 - 400 K/uL Final    see previous plts, plt slide reveiws    PLATELET SLIDE REVIEW 01/15/2019 Decreased   Final    Neutrophils % 01/15/2019 45.6  % Final    Lymphocytes % 01/15/2019 34.5  % Final    Monocytes % 01/15/2019 18.1  % Final    Eosinophils % 01/15/2019 1.5  % Final    Basophils % 01/15/2019 0.3  % Final    Neutrophils # 01/15/2019 2.2  1.4 - 6.5 K/uL Final    Lymphocytes # 01/15/2019 1.7  1.0 - 4.8 K/uL Final    Monocytes # 01/15/2019 0.9* 0.2 - 0.8 K/uL Final    Eosinophils # 01/15/2019 0.1  0.0 - 0.7 K/uL Final    Basophils # 01/15/2019 0.0  0.0 - 0.2 K/uL Final   Orders Only on 12/23/2018   Component Date Value Ref Range Status    Sodium 12/23/2018 131* 132 - 144 mEq/L Final    Potassium 12/23/2018 4.8  3.5 - 5.1 mEq/L Final    Chloride 12/23/2018 86* 98 - 107 mEq/L Final    CO2 12/23/2018 36* 22 - 29 mEq/L Final    Anion Gap 12/23/2018 9  7 - 13 mEq/L Final    Glucose 12/23/2018 82  74 - 109 mg/dL Final    BUN 12/23/2018 14 6 - 20 mg/dL Final    CREATININE 12/23/2018 0.78  0.50 - 0.90 mg/dL Final    GFR Non- 12/23/2018 >60.0  >60 Final    Comment: >60 mL/min/1.73m2 EGFR, calc. for ages 25 and older using the  MDRD formula (not corrected for weight), is valid for stable  renal function.  GFR  12/23/2018 >60.0  >60 Final    Comment: >60 mL/min/1.73m2 EGFR, calc. for ages 25 and older using the  MDRD formula (not corrected for weight), is valid for stable  renal function.  Calcium 12/23/2018 9.2  8.6 - 10.2 mg/dL Final    Total Protein 12/23/2018 7.0  6.4 - 8.1 g/dL Final    Alb 12/23/2018 3.3* 3.9 - 4.9 g/dL Final    Total Bilirubin 12/23/2018 0.4  0.0 - 1.2 mg/dL Final    Alkaline Phosphatase 12/23/2018 90  40 - 130 U/L Final    ALT 12/23/2018 9  0 - 33 U/L Final    AST 12/23/2018 26  0 - 35 U/L Final    Globulin 12/23/2018 3.7* 2.3 - 3.5 g/dL Final   Orders Only on 12/21/2018   Component Date Value Ref Range Status    Sodium 12/21/2018 132  132 - 144 mEq/L Final    Potassium 12/21/2018 4.9  3.5 - 5.1 mEq/L Final    Chloride 12/21/2018 89* 98 - 107 mEq/L Final    CO2 12/21/2018 37* 22 - 29 mEq/L Final    Anion Gap 12/21/2018 6* 7 - 13 mEq/L Final    Glucose 12/21/2018 68* 74 - 109 mg/dL Final    BUN 12/21/2018 16  6 - 20 mg/dL Final    CREATININE 12/21/2018 0.74  0.50 - 0.90 mg/dL Final    GFR Non- 12/21/2018 >60.0  >60 Final    Comment: >60 mL/min/1.73m2 EGFR, calc. for ages 25 and older using the  MDRD formula (not corrected for weight), is valid for stable  renal function.  GFR  12/21/2018 >60.0  >60 Final    Comment: >60 mL/min/1.73m2 EGFR, calc. for ages 25 and older using the  MDRD formula (not corrected for weight), is valid for stable  renal function.       Calcium 12/21/2018 9.1  8.6 - 10.2 mg/dL Final    Total Protein 12/21/2018 6.6  6.4 - 8.1 g/dL Final    Alb 12/21/2018 3.1* 3.9 - 4.9 g/dL Final    Total Bilirubin 12/21/2018 0.3  0.0 - 1.2 mg/dL Final    Alkaline Phosphatase 12/21/2018 77  40 - 130 U/L Final    ALT 12/21/2018 8  0 - 33 U/L Final    AST 12/21/2018 35  0 - 35 U/L Final    Comment: Specimen hemolysis has exceeded the interference as defined  by [employer]. Value may be falsely increased. Suggest  recollection if clinically indicated.  Globulin 12/21/2018 3.5  2.3 - 3.5 g/dL Final   Orders Only on 12/18/2018   Component Date Value Ref Range Status    WBC 12/18/2018 5.2  4.8 - 10.8 K/uL Final    RBC 12/18/2018 3.71* 4.20 - 5.40 M/uL Final    Hemoglobin 12/18/2018 12.8  12.0 - 16.0 g/dL Final    Hematocrit 12/18/2018 37.8  37.0 - 47.0 % Final    MCV 12/18/2018 101.8* 82.0 - 100.0 fL Final    MCH 12/18/2018 34.6* 27.0 - 31.3 pg Final    MCHC 12/18/2018 33.9  33.0 - 37.0 % Final    RDW 12/18/2018 13.7  11.5 - 14.5 % Final    Platelets 34/59/6838 53* 130 - 400 K/uL Final    Neutrophils % 12/18/2018 42.5  % Final    Lymphocytes % 12/18/2018 37.6  % Final    Monocytes % 12/18/2018 17.1  % Final    Eosinophils % 12/18/2018 2.4  % Final    Basophils % 12/18/2018 0.4  % Final    Neutrophils # 12/18/2018 2.2  1.4 - 6.5 K/uL Final    Lymphocytes # 12/18/2018 1.9  1.0 - 4.8 K/uL Final    Monocytes # 12/18/2018 0.9* 0.2 - 0.8 K/uL Final    Eosinophils # 12/18/2018 0.1  0.0 - 0.7 K/uL Final    Basophils # 12/18/2018 0.0  0.0 - 0.2 K/uL Final   There may be more visits with results that are not included. HPI:    Constipation   This is a chronic problem. The problem has been waxing and waning since onset. The stool is described as formed. The patient is not on a high fiber diet. She does not exercise regularly. There has been adequate water intake. Pertinent negatives include no diarrhea, fever, hematochezia or melena. Risk factors include obesity and immobility. She has tried fiber and stool softeners for the symptoms. The treatment provided mild relief.  Her past medical history is significant for psychiatric history. There is no history of inflammatory bowel disease or irritable bowel syndrome. More detail above in the chiefcomplaint(s), interim history and below in the review of systems.      Past Medical History:   Diagnosis Date    Bipolar affective disorder, current episode manic with psychotic symptoms ([facility] [address] 08/19/2013    Dr Irving Haynes Diastolic heart failure, NYHA class 1 ([facility] [address] 2015    2 D Echo, impaired relaxation, EF 50%    Dysphagia, oropharyngeal phase     Elevated diaphragm 2016    R side    History of Clostridium difficile colitis 1/2014    fecal transplant    Kidney congenitally absent, right     CT 2014    Left knee DJD 2013    severe, Dr Lisseth Jonas    Localized, primary osteoarthritis of lower leg     Lower extremity pain, bilateral     Lymphedema of both lower extremities     lymphedema tarda    Mental retardation, moderate (I.Q. 35-49)     Mixed sleep apnea     Morbid obesity with alveolar hypoventilation (HCC)     Nocturnal hypoxemia due to obesity     Other primary thrombocytopenia (HCC)     Other specified behavioral problem     [facility]    due to aspiration pneumonia ([facility])    Pulmonary aspiration, history of     S/P colonoscopy 2011    normal    Sinus tachycardia     Solitary cyst of kidney 2014    Vitamin D deficiency        Past Surgical History:   Procedure Laterality Date    GASTROSTOMY TUBE CHANGE  04/08/15    GASTROSTOMY TUBE PLACEMENT  8/13/13    Dr Chas Kirkpatrick HISTORY  7/9/14    Change of G tube       Social History     Socioeconomic History    Marital status: Single     Spouse name: Not on file    Number of children: 0    Years of education: Not on file    Highest education level: Not on file   Occupational History    Occupation: SSI   Social Needs    Financial resource strain: Not on file    Food insecurity:     Worry: Not on file     Inability: Not on file   Bracken Geovanni Transportation needs:     Medical: Not on file     Non-medical: Not on file   Tobacco Use    Smoking status: Never Smoker    Smokeless tobacco: Never Used   Substance and Sexual Activity    Alcohol use: No     Alcohol/week: 0.0 oz    Drug use: No    Sexual activity: Not Currently   Lifestyle    Physical activity:     Days per week: Not on file     Minutes per session: Not on file    Stress: Not on file   Relationships    Social connections:     Talks on phone: Not on file     Gets together: Not on file     Attends Yazidi service: Not on file     Active member of club or organization: Not on file     Attends meetings of clubs or organizations: Not on file     Relationship status: Not on file    Intimate partner violence:     Fear of current or ex partner: Not on file     Emotionally abused: Not on file     Physically abused: Not on file     Forced sexual activity: Not on file   Other Topics Concern    Not on file   Social History Narrative    Has some family, mother and sister in [place], they visit occasionally    POA APSI               Family History   Family history unknown: Yes       Family and socialhistory were reviewed and pertinent changes documented. ALLERGIES    Patient has no known allergies.     Current Outpatient Medications on File Prior to Visit   Medication Sig Dispense Refill    valproic acid (DEPAKENE) 250 MG capsule Take 250 mg by mouth 4 times daily      cloZAPine (CLOZARIL) 25 MG tablet Take 25 mg by mouth 2 times daily      levothyroxine (SYNTHROID) 25 MCG tablet 1 tablet by PEG Tube route Daily 90 tablet 03    magnesium hydroxide (MILK OF MAGNESIA) 400 MG/5ML suspension Give 30 cc per PEG x 1 if no stool x 2 consecutive days 355 mL 1    OLANZapine (ZYPREXA) 5 MG tablet 1 tablet by PEG Tube route nightly (Patient taking differently: 7.5 mg by PEG Tube route nightly ) 30 tablet 3    Nutritional Supplements (JEVITY 1.2 KENA/FIBER) LIQD Give 3 cans per peg daily 1000 mL 1    She has no rales. Abdominal: Soft. She exhibits no distension. Exam limited due to abdominal adiposity      Musculoskeletal: She exhibits no tenderness. Neurological: She is alert. Coordination normal.   Skin: Skin is warm and intact. There is pallor. Psychiatric: Her mood appears not anxious. Her affect is labile. She is not combative. Cognition and memory are impaired. She does not exhibit a depressed mood. She is noncommunicative. Repetitive, answers some simple questions  She is inattentive. Assessment:    Mayad was seen today for edema and constipation. Diagnoses and all orders for this visit:    Lymphedema             Patient is referred to the lymphedema clinic and should continue follow-up and treatments as per therapist's recommendations. To also try to increase physical activity as tolerated ) patient has been virtually nonambulatory for a number of months now). Continue to monitor diet to avoid further weight gain. Constipation, unspecified constipation type    Other orders  -     psyllium (METAMUCIL SMOOTH TEXTURE) 28 % packet; Take 1 packet by mouth daily        Plan:    Reviewed with the patient (/and caregiver if present): current health status, medications, activities and diet. See also orders and comments in the assessment section. Today's diagnosis (in the context ofchronic problems) was discussed with patient (/and caregiver if present), questions answered. Orders Placed This Encounter   Medications    psyllium (METAMUCIL SMOOTH TEXTURE) 28 % packet     Sig: Take 1 packet by mouth daily     Dispense:  30 packet     Refill:  5       Close follow up needed to evaluate treatment results and for care coordination. I have reviewed the patient's medical and surgical, family and social history, health maintenance schedule, and updated the computerized patient record. Please note this report has been partially produced by using speech recognition hardware. It may contain errors related to the system, including grammar, punctuation and spelling as well as words and phrases that may seem inaccurate. For anyquestions or concerns, please feel free to contact me for clarification.         Electronically signed by Sharmila Hayes MD

## 2019-05-07 ENCOUNTER — OFFICE VISIT (OUTPATIENT)
Dept: CARDIOLOGY CLINIC | Age: 59
End: 2019-05-07
Payer: MEDICARE

## 2019-05-07 VITALS
WEIGHT: 192 LBS | HEART RATE: 68 BPM | HEIGHT: 62 IN | SYSTOLIC BLOOD PRESSURE: 126 MMHG | DIASTOLIC BLOOD PRESSURE: 80 MMHG | RESPIRATION RATE: 12 BRPM | BODY MASS INDEX: 35.33 KG/M2

## 2019-05-07 DIAGNOSIS — I10 ESSENTIAL HYPERTENSION: Primary | ICD-10-CM

## 2019-05-07 DIAGNOSIS — R00.0 SINUS TACHYCARDIA: ICD-10-CM

## 2019-05-07 DIAGNOSIS — I50.30 DIASTOLIC HEART FAILURE, NYHA CLASS 1 (HCC): ICD-10-CM

## 2019-05-07 PROCEDURE — G8417 CALC BMI ABV UP PARAM F/U: HCPCS | Performed by: INTERNAL MEDICINE

## 2019-05-07 PROCEDURE — G8427 DOCREV CUR MEDS BY ELIG CLIN: HCPCS | Performed by: INTERNAL MEDICINE

## 2019-05-07 PROCEDURE — 1036F TOBACCO NON-USER: CPT | Performed by: INTERNAL MEDICINE

## 2019-05-07 PROCEDURE — 99213 OFFICE O/P EST LOW 20 MIN: CPT | Performed by: INTERNAL MEDICINE

## 2019-05-07 PROCEDURE — 3017F COLORECTAL CA SCREEN DOC REV: CPT | Performed by: INTERNAL MEDICINE

## 2019-05-07 RX ORDER — SPIRONOLACTONE 25 MG/1
TABLET ORAL
COMMUNITY
Start: 2019-04-29 | End: 2019-08-21

## 2019-05-07 RX ORDER — OLANZAPINE 2.5 MG/1
2.5 TABLET ORAL EVERY MORNING
COMMUNITY
End: 2019-10-01 | Stop reason: ALTCHOICE

## 2019-05-07 RX ORDER — OLANZAPINE 7.5 MG/1
7.5 TABLET ORAL NIGHTLY
COMMUNITY

## 2019-05-07 RX ORDER — POTASSIUM CHLORIDE 20MEQ/15ML
LIQUID (ML) ORAL
COMMUNITY
Start: 2019-04-28 | End: 2019-06-14 | Stop reason: ALTCHOICE

## 2019-05-07 NOTE — PROGRESS NOTES
[facility] CARDIOLOGY OFFICE FOLLOW-UP      Patient: Christin French  YOB: 1960  MRN: [id number]    Chief Complaint:  Chief Complaint   Patient presents with    1 Year Follow Up    Hypertension    Tachycardia         Subjective/HPI:  5/7/19: Patient presents today for *follow-up of chest pain. She has MRDD and lives in a group home. Accompanied by care provider. No acute distress. Also history of obesity and sleep apnea which is stable. See me in 6 months.    4/25/18: Patient presents today for follow-up of chest pain. She denies any chest pain now. She has MRDD. Lives in a group home. Accompanied by care provider. In no acute distress. He has a history of obstructive sleep apnea that stable. Moderately obese. See me in one year.      4/25/17: for follow-up of chest pain. Has MRDD. Offers no complaints. She is moderately obese. Accompanied by care provider. See me in 6 months to a year. No change in medications. Also has obstructive sleep apnea and degenerative joint disease that is stable.     10/25/16: yoshi patino . Has MRDD. Obese. Accompanied by careprovider. DC zocor. Has LIZETH and DJD. See in 6M     4/26/16: see in 6M      10/27/15: see me in 6 months.        Past Medical History:   Diagnosis Date    Bipolar affective disorder, current episode manic with psychotic symptoms ([address] 08/19/2013    Dr Mosley Senior Diastolic heart failure, NYHA class 1 ([address] 2015    2 D Echo, impaired relaxation, EF 50%    Dysphagia, oropharyngeal phase     Elevated diaphragm 2016    R side    History of Clostridium difficile colitis 1/2014    fecal transplant    Kidney congenitally absent, right     CT 2014    Left knee DJD 2013    severe, Dr Lydia Kemp    Localized, primary osteoarthritis of lower leg     Lower extremity pain, bilateral     Lymphedema of both lower extremities     lymphedema tarda    Mental retardation, moderate (I.Q. 35-49)     Mixed sleep apnea     Morbid obesity with alveolar hypoventilation (HCC)     Nocturnal hypoxemia due to obesity     Other primary thrombocytopenia (HCC)     Other specified behavioral problem     [facility] 2013    due to aspiration pneumonia ([facility])    Pulmonary aspiration, history of     S/P colonoscopy 2011    normal    Sinus tachycardia     Solitary cyst of kidney 2014    Vitamin D deficiency        Past Surgical History:   Procedure Laterality Date    GASTROSTOMY TUBE CHANGE  04/08/15    GASTROSTOMY TUBE PLACEMENT  8/13/13    Dr Magan Holland HISTORY  7/9/14    Change of G tube       Family History   Family history unknown: Yes       Social History     Socioeconomic History    Marital status: Single     Spouse name: None    Number of children: 0    Years of education: None    Highest education level: None   Occupational History    Occupation: SSI   Social Needs    Financial resource strain: None    Food insecurity:     Worry: None     Inability: None    Transportation needs:     Medical: None     Non-medical: None   Tobacco Use    Smoking status: Never Smoker    Smokeless tobacco: Never Used   Substance and Sexual Activity    Alcohol use: No     Alcohol/week: 0.0 oz    Drug use: No    Sexual activity: Not Currently   Lifestyle    Physical activity:     Days per week: None     Minutes per session: None    Stress: None   Relationships    Social connections:     Talks on phone: None     Gets together: None     Attends Spiritism service: None     Active member of club or organization: None     Attends meetings of clubs or organizations: None     Relationship status: None    Intimate partner violence:     Fear of current or ex partner: None     Emotionally abused: None     Physically abused: None     Forced sexual activity: None   Other Topics Concern    None   Social History Narrative    Has some family, mother and sister in [place], they visit occasionally    POA APSI No Known Allergies    Current Outpatient Medications   Medication Sig Dispense Refill    spironolactone (ALDACTONE) 25 MG tablet       potassium chloride 20 MEQ/15ML (10%) oral solution       Dextromethorphan-guaiFENesin (CORICIDIN HBP CONGESTION/COUGH)  MG CAPS Take 1 tablet by mouth every 6 hours 168 capsule 0    furosemide (LASIX) 20 MG tablet 1 tablet by PEG Tube route daily 30 tablet 2    valproic acid (DEPAKENE) 250 MG capsule Take 250 mg by mouth 4 times daily      cloZAPine (CLOZARIL) 25 MG tablet Take 25 mg by mouth 2 times daily      levothyroxine (SYNTHROID) 25 MCG tablet 1 tablet by PEG Tube route Daily 90 tablet 03    magnesium hydroxide (MILK OF MAGNESIA) 400 MG/5ML suspension Give 30 cc per PEG x 1 if no stool x 2 consecutive days 355 mL 1    OLANZapine (ZYPREXA) 5 MG tablet 1 tablet by PEG Tube route nightly (Patient taking differently: 7.5 mg by PEG Tube route nightly ) 30 tablet 3    Nutritional Supplements (JEVITY 1.2 KENA/FIBER) LIQD Give 3 cans per peg daily 1000 mL 1    OXYGEN Nocturnal, use as directed 1 Units 0    nystatin (MYCOSTATIN) 784886 UNIT/GM cream Apply topically 2 times daily. 30 g 1    valproate (DEPAKENE) 250 MG/5ML solution 250 mg by Per G Tube route every morning Along with 750 mg via G tube bid @ 4 pm & 8 pm      Cholecalciferol (VITAMIN D) 400 UNIT/ML LIQD 1 mL by PEG Tube route every morning      carvedilol (COREG) 6.25 MG tablet 6.25 mg by PEG Tube route 2 times daily (with meals)        No current facility-administered medications for this visit. Review of Systems:   Review of Systems   Constitutional: Negative for appetite change, diaphoresis and fatigue. Respiratory: Negative for apnea, chest tightness and shortness of breath. Cardiovascular: Negative for chest pain, palpitations and leg swelling. Skin: Negative for color change, pallor, rash and wound.    Neurological: Negative for dizziness, syncope, weakness, light-headedness and headaches. Psychiatric/Behavioral: Negative for agitation, behavioral problems and confusion. The patient is not nervous/anxious and is not hyperactive. Review of System is negative except for as mentioned above. Physical Examination:    Ht 5' 2\" (1.575 m)   Wt 192 lb (87.1 kg)   LMP  (LMP Unknown)   BMI 35.12 kg/m²    Physical Exam   Constitutional: She appears healthy. HENT:   Nose: Nose normal.   Mouth/Throat: Dentition is normal. Oropharynx is clear. Eyes: Pupils are equal, round, and reactive to light. Neck: Normal range of motion. Cardiovascular: Normal rate, regular rhythm, S1 normal, S2 normal, normal heart sounds, intact distal pulses and normal pulses. No extrasystoles are present. Exam reveals no gallop. No murmur heard. Pulmonary/Chest: Effort normal and breath sounds normal. She has no wheezes. She has no rales. She exhibits no tenderness. Abdominal: Soft. Bowel sounds are normal. She exhibits no distension and no mass. There is no splenomegaly or hepatomegaly. There is no tenderness. Musculoskeletal: Normal range of motion. She exhibits no edema, tenderness or deformity. Neurological: She is alert and oriented to person, place, and time. She has normal motor skills and normal reflexes. Gait normal.   Skin: Skin is warm and dry.        LABS:  CBC:   Lab Results   Component Value Date    WBC 5.8 03/05/2019    RBC 3.76 03/05/2019    RBC 4.63 06/06/2012    HGB 13.0 03/05/2019    HCT 38.1 03/05/2019    .3 03/05/2019    MCH 34.6 03/05/2019    MCHC 34.1 03/05/2019    RDW 13.0 03/05/2019    PLT 68 03/05/2019    MPV 11.0 10/20/2015     Lipids:  Lab Results   Component Value Date    CHOL 124 05/15/2018    CHOL 123 03/01/2016    CHOL 112 06/09/2015     Lab Results   Component Value Date    TRIG 133 05/15/2018    TRIG 174 03/01/2016    TRIG 147 06/09/2015     Lab Results   Component Value Date    HDL 42 05/15/2018    HDL 44 03/01/2016    HDL 40 06/09/2015     Lab Results Component Value Date    LDLCALC 55 05/15/2018    LDLCALC 44 03/01/2016    LDLCALC 43 06/09/2015     No results found for: LABVLDL, VLDL  Lab Results   Component Value Date    CHOLHDLRATIO 2.6 03/12/2013    CHOLHDLRATIO 2.6 11/20/2012    CHOLHDLRATIO 2.5 11/19/2012     CMP:    Lab Results   Component Value Date     03/27/2019    K 5.0 03/27/2019    CL 88 03/27/2019    CO2 35 03/27/2019    BUN 18 03/27/2019    CREATININE 0.60 03/27/2019    GFRAA >60.0 03/27/2019    LABGLOM >60.0 03/27/2019    GLUCOSE 88 03/27/2019    GLUCOSE 43 06/06/2012    PROT 7.1 03/27/2019    LABALBU 3.6 03/27/2019    LABALBU 3.6 04/17/2012    CALCIUM 9.2 03/27/2019    BILITOT 0.3 03/27/2019    ALKPHOS 96 03/27/2019    AST 29 03/27/2019    ALT 11 03/27/2019     BMP:    Lab Results   Component Value Date     03/27/2019    K 5.0 03/27/2019    CL 88 03/27/2019    CO2 35 03/27/2019    BUN 18 03/27/2019    LABALBU 3.6 03/27/2019    LABALBU 3.6 04/17/2012    CREATININE 0.60 03/27/2019    CALCIUM 9.2 03/27/2019    GFRAA >60.0 03/27/2019    LABGLOM >60.0 03/27/2019    GLUCOSE 88 03/27/2019    GLUCOSE 43 06/06/2012     Magnesium:    Lab Results   Component Value Date    MG 2.3 01/25/2015     TSH:  Lab Results   Component Value Date    TSH 2.440 12/03/2018       Patient Active Problem List   Diagnosis    HTN (hypertension)    Hypercholesteremia    Mental retardation, moderate (I.Q. 35-49)    Obesity    Localized primary osteoarthritis of lower leg    Vitamin D deficiency    Depression    Other specified behavioral problem    Mixed sleep apnea    Thrombocytopenia due to drugs    Macrocytosis without anemia    Sinus tachycardia    History of Clostridium difficile infection    Kidney congenitally absent, right    Solitary cyst of kidney    Chronic orthostatic hypotension    Pulmonary aspiration, history of    Diastolic heart failure, NYHA class 1 (HCC)    Bipolar affective disorder, current episode manic with psychotic

## 2019-05-13 ASSESSMENT — ENCOUNTER SYMPTOMS
SHORTNESS OF BREATH: 0
COLOR CHANGE: 0
APNEA: 0
CHEST TIGHTNESS: 0

## 2019-06-03 ENCOUNTER — TELEPHONE (OUTPATIENT)
Dept: SURGERY | Age: 59
End: 2019-06-03

## 2019-06-03 DIAGNOSIS — K94.23 GASTROSTOMY MECHANICAL COMPLICATION (HCC): ICD-10-CM

## 2019-06-03 DIAGNOSIS — R13.10 DYSPHAGIA, UNSPECIFIED TYPE: Primary | ICD-10-CM

## 2019-06-04 ENCOUNTER — OFFICE VISIT (OUTPATIENT)
Dept: INTERNAL MEDICINE CLINIC | Age: 59
End: 2019-06-04
Payer: MEDICARE

## 2019-06-04 VITALS
RESPIRATION RATE: 16 BRPM | SYSTOLIC BLOOD PRESSURE: 126 MMHG | TEMPERATURE: 98 F | HEIGHT: 62 IN | OXYGEN SATURATION: 98 % | BODY MASS INDEX: 36.44 KG/M2 | WEIGHT: 198 LBS | DIASTOLIC BLOOD PRESSURE: 88 MMHG | HEART RATE: 78 BPM

## 2019-06-04 DIAGNOSIS — Z00.00 ANNUAL PHYSICAL EXAM: ICD-10-CM

## 2019-06-04 DIAGNOSIS — E87.1 HYPONATREMIA: ICD-10-CM

## 2019-06-04 DIAGNOSIS — I89.0 LYMPHEDEMA: Primary | ICD-10-CM

## 2019-06-04 PROCEDURE — 1036F TOBACCO NON-USER: CPT | Performed by: INTERNAL MEDICINE

## 2019-06-04 PROCEDURE — 3017F COLORECTAL CA SCREEN DOC REV: CPT | Performed by: INTERNAL MEDICINE

## 2019-06-04 PROCEDURE — 99213 OFFICE O/P EST LOW 20 MIN: CPT | Performed by: INTERNAL MEDICINE

## 2019-06-04 PROCEDURE — G8417 CALC BMI ABV UP PARAM F/U: HCPCS | Performed by: INTERNAL MEDICINE

## 2019-06-04 PROCEDURE — G8427 DOCREV CUR MEDS BY ELIG CLIN: HCPCS | Performed by: INTERNAL MEDICINE

## 2019-06-04 ASSESSMENT — ENCOUNTER SYMPTOMS
FACIAL SWELLING: 0
CHOKING: 0
VOICE CHANGE: 0
SHORTNESS OF BREATH: 1
DIARRHEA: 0
CONSTIPATION: 1
BLOOD IN STOOL: 0
COUGH: 0

## 2019-06-04 NOTE — PROGRESS NOTES
Chace Gaona is a 60 y.o. female nonsmoker, with mental retardation, bipolar illness, obesity, PEG status due to aspiration pneumonia, lymphedema of the lower extremities, who presents with     Chief Complaint   Patient presents with    Edema     leg edema has been stable, patient tolerates the lymphedema pump on an intermittent basis, ambulation remains minimal    Annual Exam    Abnormal Test Results     recent laboratories reveal mild hyponatremia and hyperkalemia, while patient on diuretics which include furosemide and spironolactone, both low dose       Interim history: Since her last office visit with me 3 months ago, the patient has been well, no emergency room or hospital admissions. Had one visit to the walk in clinic for a viral upper respiratory tract infection. Continues to see her cardiologist and her psychiatrist.  Gianni Navas to the office visit today in her wheelchair, by one of her caregivers from the [facility]. Monthly orders for the [facility] were reviewed and signed in the interim. The following laboratory reports since the past visit were reviewed (the ones pertinent to today's visit were discussed with the patient/ caregiver):    Orders Only on 04/12/2019   Component Date Value Ref Range Status    Valproic Acid Lvl 04/12/2019 91.3  50.0 - 100.0 ug/mL Final   Orders Only on 03/27/2019   Component Date Value Ref Range Status    Vit D, 25-Hydroxy 03/27/2019 80.8  30.0 - 100.0 ng/mL Final    Comment: ( ng/mL)  Optimum Level    This assay accurately quantifies the sum of vitamin D3, 25-Hydroxy and  vitamin D2, 25-Hyroxy. Orders Only on 03/27/2019   Component Date Value Ref Range Status    Sodium 03/27/2019 131* 135 - 144 mEq/L Final    Comment: Effective:  2/7/2019  New reference range for this analyte has been established.  Potassium 03/27/2019 5.0* 3.4 - 4.9 mEq/L Final    Comment: Effective:  2/7/2019  New reference range for this analyte has been established.       Chloride 03/27/2019 88* 95 - 107 mEq/L Final    Comment: Effective:  2/7/2019  New reference range for this analyte has been established.  CO2 03/27/2019 35* 20 - 31 mEq/L Final    Comment: Effective:  2/7/2019  New reference range for this analyte has been established.  Anion Gap 03/27/2019 8* 9 - 15 mEq/L Final    Comment: Effective:  2/7/2019  New reference range for this analyte has been established.  Glucose 03/27/2019 88  70 - 99 mg/dL Final    Comment: Effective:  2/7/2019  New reference range for this analyte has been established.  BUN 03/27/2019 18  6 - 20 mg/dL Final    CREATININE 03/27/2019 0.60  0.50 - 0.90 mg/dL Final    GFR Non- 03/27/2019 >60.0  >60 Final    Comment: >60 mL/min/1.73m2 EGFR, calc. for ages 25 and older using the  MDRD formula (not corrected for weight), is valid for stable  renal function.  GFR  03/27/2019 >60.0  >60 Final    Comment: >60 mL/min/1.73m2 EGFR, calc. for ages 25 and older using the  MDRD formula (not corrected for weight), is valid for stable  renal function.  Calcium 03/27/2019 9.2  8.5 - 9.9 mg/dL Final    Comment: Effective:  2/7/2019  New reference range for this analyte has been established.  Total Protein 03/27/2019 7.1  6.3 - 8.0 g/dL Final    Comment: Effective:  2/7/2019  New reference range for this analyte has been established.  Alb 03/27/2019 3.6  3.5 - 4.6 g/dL Final    Comment: Effective:  2/7/2019  New reference range for this analyte has been established.  Total Bilirubin 03/27/2019 0.3  0.2 - 0.7 mg/dL Final    Comment: Effective:  2/7/2019  New reference range for this analyte has been established.       Alkaline Phosphatase 03/27/2019 96  40 - 130 U/L Final    ALT 03/27/2019 11  0 - 33 U/L Final    AST 03/27/2019 29  0 - 35 U/L Final    Globulin 03/27/2019 3.5  2.3 - 3.5 g/dL Final   Orders Only on 03/27/2019   Component Date Value Ref Range Status    Valproic Acid Lvl 03/27/2019 131.4* 50.0 - 100.0 ug/mL Final   Orders Only on 03/27/2019   Component Date Value Ref Range Status    Ammonia 03/27/2019 47  11 - 51 umol/L Final   Orders Only on 03/05/2019   Component Date Value Ref Range Status    WBC 03/05/2019 5.8  4.8 - 10.8 K/uL Final    RBC 03/05/2019 3.76* 4.20 - 5.40 M/uL Final    Hemoglobin 03/05/2019 13.0  12.0 - 16.0 g/dL Final    Hematocrit 03/05/2019 38.1  37.0 - 47.0 % Final    MCV 03/05/2019 101.3* 82.0 - 100.0 fL Final    MCH 03/05/2019 34.6* 27.0 - 31.3 pg Final    MCHC 03/05/2019 34.1  33.0 - 37.0 % Final    RDW 03/05/2019 13.0  11.5 - 14.5 % Final    Platelets 13/31/4015 68* 130 - 400 K/uL Final    PLATELET SLIDE REVIEW 03/05/2019 Decreased   Final    SLIDE REVIEW 03/05/2019 see below   Final    Slide review agrees with reported results    Neutrophils % 03/05/2019 46.9  % Final    Lymphocytes % 03/05/2019 36.4  % Final    Monocytes % 03/05/2019 15.4  % Final    Eosinophils % 03/05/2019 1.0  % Final    Basophils % 03/05/2019 0.3  % Final    Neutrophils # 03/05/2019 2.7  1.4 - 6.5 K/uL Final    Lymphocytes # 03/05/2019 2.1  1.0 - 4.8 K/uL Final    Monocytes # 03/05/2019 0.9* 0.2 - 0.8 K/uL Final    Eosinophils # 03/05/2019 0.1  0.0 - 0.7 K/uL Final    Basophils # 03/05/2019 0.0  0.0 - 0.2 K/uL Final       HPI:    HPI     Edema  Patient has edema in both legs, all way up, left more than the right. The edema has been severe. Onset of symptoms was several years ago, and patient reports symptoms have stabilized since that time. The edema is present all day. The patient states the problem is long-standing. The swelling has been aggravated by dependency of involved area and increased salt intake. The swelling has been relieved by support stockings, elevation of involved area, diuretics and lymphedema pump. Associated factors include: shortness of breath. Cardiac risk factors include obesity (BMI >= 30 kg/m2) and sedentary lifestyle.     More detail above in the chiefcomplaint(s), interim history and below in the review of systems.      Past Medical History:   Diagnosis Date    Bipolar affective disorder, current episode manic with psychotic symptoms ([facility] 08/19/2013    Dr Marvin West Diastolic heart failure, NYHA class 1 ([facility] 2015    2 D Echo, impaired relaxation, EF 50%    Dysphagia, oropharyngeal phase     Elevated diaphragm 2016    R side    History of Clostridium difficile colitis 1/2014    fecal transplant    Kidney congenitally absent, right     CT 2014    Left knee DJD 2013    severe, Dr Yissel Pugh    Localized, primary osteoarthritis of lower leg     Lower extremity pain, bilateral     Lymphedema of both lower extremities     lymphedema tarda    Mental retardation, moderate (I.Q. 35-49)     Mixed sleep apnea     Morbid obesity with alveolar hypoventilation (HCC)     Nocturnal hypoxemia due to obesity     Other primary thrombocytopenia (HCC)     Other specified behavioral problem     [facility]    due to aspiration pneumonia ([facility])    Pulmonary aspiration, history of     S/P colonoscopy 2011    normal    Sinus tachycardia     Solitary cyst of kidney 2014    Vitamin D deficiency        Past Surgical History:   Procedure Laterality Date    GASTROSTOMY TUBE CHANGE  04/08/15    GASTROSTOMY TUBE PLACEMENT  8/13/13    Dr Shah [address] HISTORY  7/9/14    Change of G tube       Social History     Socioeconomic History    Marital status: Single     Spouse name: Not on file    Number of children: 0    Years of education: Not on file    Highest education level: Not on file   Occupational History    Occupation: SSI   Social Needs    Financial resource strain: Not on file    Food insecurity:     Worry: Not on file     Inability: Not on file    Transportation needs:     Medical: Not on file     Non-medical: Not on file   Tobacco Use    Smoking status: Never Smoker  Smokeless tobacco: Never Used   Substance and Sexual Activity    Alcohol use: No     Alcohol/week: 0.0 oz    Drug use: No    Sexual activity: Not Currently   Lifestyle    Physical activity:     Days per week: Not on file     Minutes per session: Not on file    Stress: Not on file   Relationships    Social connections:     Talks on phone: Not on file     Gets together: Not on file     Attends Hoahaoism service: Not on file     Active member of club or organization: Not on file     Attends meetings of clubs or organizations: Not on file     Relationship status: Not on file    Intimate partner violence:     Fear of current or ex partner: Not on file     Emotionally abused: Not on file     Physically abused: Not on file     Forced sexual activity: Not on file   Other Topics Concern    Not on file   Social History Narrative    Has some family, mother and sister in [place], they visit occasionally    POA APSI               Family History   Family history unknown: Yes       Family and socialhistory were reviewed and pertinent changes documented. ALLERGIES    Patient has no known allergies.     Current Outpatient Medications on File Prior to Visit   Medication Sig Dispense Refill    spironolactone (ALDACTONE) 25 MG tablet       potassium chloride 20 MEQ/15ML (10%) oral solution       OLANZapine (ZYPREXA) 2.5 MG tablet Take 2.5 mg by mouth every morning      OLANZapine (ZYPREXA) 10 MG tablet Take 10 mg by mouth nightly      Dextromethorphan-guaiFENesin (CORICIDIN HBP CONGESTION/COUGH)  MG CAPS Take 1 tablet by mouth every 6 hours 168 capsule 0    furosemide (LASIX) 20 MG tablet 1 tablet by PEG Tube route daily 30 tablet 2    valproic acid (DEPAKENE) 250 MG capsule Take 250 mg by mouth 4 times daily      cloZAPine (CLOZARIL) 25 MG tablet Take 25 mg by mouth 2 times daily      levothyroxine (SYNTHROID) 25 MCG tablet 1 tablet by PEG Tube route Daily 90 tablet 03    magnesium hydroxide (MILK OF MAGNESIA) 400 MG/5ML suspension Give 30 cc per PEG x 1 if no stool x 2 consecutive days 355 mL 1    Nutritional Supplements (JEVITY 1.2 KENA/FIBER) LIQD Give 3 cans per peg daily 1000 mL 1    OXYGEN Nocturnal, use as directed 1 Units 0    nystatin (MYCOSTATIN) 032335 UNIT/GM cream Apply topically 2 times daily. 30 g 1    valproate (DEPAKENE) 250 MG/5ML solution 250 mg by Per G Tube route every morning Along with 750 mg via G tube bid @ 4 pm & 8 pm      Cholecalciferol (VITAMIN D) 400 UNIT/ML LIQD 1 mL by PEG Tube route every morning      carvedilol (COREG) 6.25 MG tablet 6.25 mg by PEG Tube route 2 times daily (with meals)        No current facility-administered medications on file prior to visit. Review of Systems   Unable to perform ROS: Psychiatric disorder   Constitutional: Negative for activity change, appetite change, fever and unexpected weight change. HENT: Negative for facial swelling, nosebleeds and voice change. Respiratory: Positive for shortness of breath (exertional). Negative for cough and choking. Cardiovascular: Positive for leg swelling (chronic). Gastrointestinal: Positive for constipation. Negative for blood in stool and diarrhea. Endocrine: Negative for cold intolerance and heat intolerance. Genitourinary: Negative for decreased urine volume and hematuria. Skin: Negative for rash. Allergic/Immunologic: Negative for immunocompromised state. Neurological: Negative for syncope. Hematological: Does not bruise/bleed easily. Psychiatric/Behavioral: Negative for self-injury. Vitals:    06/04/19 1108   BP: 126/88   Site: Right Upper Arm   Position: Sitting   Cuff Size: Large Adult   Pulse: 78   Resp: 16   Temp: 98 °F (36.7 °C)   TempSrc: Tympanic   SpO2: 98%   Weight: 198 lb (89.8 kg)   Height: 5' 2\" (1.575 m)       Physical Exam   Constitutional: No distress. Obese, age appropriate, well groomed  Seated in the wheelchair   HENT:   Head: Atraumatic.    Eyes: No discussion and with patient's consent. Will contact patient for discussion and further management planning once reports available for review. Orders Placed This Encounter   Procedures    Basic Metabolic Panel     Standing Status:   Future     Standing Expiration Date:   9/4/2019       Close follow up needed to evaluate treatment results and for care coordination. Return in about 3 months (around 9/4/2019). I have reviewed the patient's medical and surgical, family and social history, health maintenance schedule, and updated the computerized patient record. Please note this report has been partially produced by using speech recognition hardware. It may contain errors related to the system, including grammar, punctuation and spelling as well as words and phrases that may seem inaccurate. For anyquestions or concerns, please feel free to contact me for clarification.         Electronically signed by Elin Rose MD

## 2019-06-13 LAB
ANION GAP SERPL CALCULATED.3IONS-SCNC: 15 MEQ/L (ref 9–15)
BUN BLDV-MCNC: 23 MG/DL (ref 6–20)
CALCIUM SERPL-MCNC: 9.7 MG/DL (ref 8.5–9.9)
CHLORIDE BLD-SCNC: 92 MEQ/L (ref 95–107)
CO2: 24 MEQ/L (ref 20–31)
CREAT SERPL-MCNC: 0.65 MG/DL (ref 0.5–0.9)
GFR AFRICAN AMERICAN: >60
GFR NON-AFRICAN AMERICAN: >60
GLUCOSE BLD-MCNC: 52 MG/DL (ref 70–99)
POTASSIUM SERPL-SCNC: 5.8 MEQ/L (ref 3.4–4.9)
SODIUM BLD-SCNC: 131 MEQ/L (ref 135–144)
T4 FREE: 1.27 NG/DL (ref 0.84–1.68)
TSH SERPL DL<=0.05 MIU/L-ACNC: 4.05 UIU/ML (ref 0.44–3.86)

## 2019-06-14 ENCOUNTER — OFFICE VISIT (OUTPATIENT)
Dept: ENDOCRINOLOGY | Age: 59
End: 2019-06-14
Payer: MEDICARE

## 2019-06-14 VITALS
SYSTOLIC BLOOD PRESSURE: 93 MMHG | WEIGHT: 198 LBS | HEART RATE: 92 BPM | DIASTOLIC BLOOD PRESSURE: 63 MMHG | BODY MASS INDEX: 36.44 KG/M2 | HEIGHT: 62 IN

## 2019-06-14 DIAGNOSIS — E03.9 HYPOTHYROIDISM, UNSPECIFIED TYPE: Primary | ICD-10-CM

## 2019-06-14 DIAGNOSIS — E87.5 HYPERKALEMIA: ICD-10-CM

## 2019-06-14 PROCEDURE — G8417 CALC BMI ABV UP PARAM F/U: HCPCS | Performed by: INTERNAL MEDICINE

## 2019-06-14 PROCEDURE — 1036F TOBACCO NON-USER: CPT | Performed by: INTERNAL MEDICINE

## 2019-06-14 PROCEDURE — 3017F COLORECTAL CA SCREEN DOC REV: CPT | Performed by: INTERNAL MEDICINE

## 2019-06-14 PROCEDURE — 99213 OFFICE O/P EST LOW 20 MIN: CPT | Performed by: INTERNAL MEDICINE

## 2019-06-14 PROCEDURE — G8427 DOCREV CUR MEDS BY ELIG CLIN: HCPCS | Performed by: INTERNAL MEDICINE

## 2019-06-14 RX ORDER — LEVOTHYROXINE SODIUM 0.05 MG/1
50 TABLET ORAL DAILY
Qty: 30 TABLET | Refills: 5 | Status: SHIPPED | OUTPATIENT
Start: 2019-06-14

## 2019-06-14 NOTE — PROGRESS NOTES
Subjective:      Patient ID: Vincenzo Brito is a 60 y.o. female. 6 month f/u on hypothyroidism  Other   This is a chronic (hypothyroidism) problem. The current episode started more than 1 year ago. Treatments tried: synthyroid. The treatment provided moderate relief. reviewed labs TSH was slightly high potassium was elevated at 5.8 potassium was also high at 5.0 last time patient is on p.o. potassium does take Lasix and Aldactone    Results for Lauri Hernandez (MRN [id number]) as of 6/14/2019 11:20   Ref.  Range 6/13/2019 06:55   Sodium Latest Ref Range: 135 - 144 mEq/L 131 (L)   Potassium Latest Ref Range: 3.4 - 4.9 mEq/L 5.8 (H)   Chloride Latest Ref Range: 95 - 107 mEq/L 92 (L)   CO2 Latest Ref Range: 20 - 31 mEq/L 24   BUN Latest Ref Range: 6 - 20 mg/dL 23 (H)   Creatinine Latest Ref Range: 0.50 - 0.90 mg/dL 0.65   Anion Gap Latest Ref Range: 9 - 15 mEq/L 15   GFR Non- Latest Ref Range: >60  >60.0   GFR African American Latest Ref Range: >60  >60.0   Glucose Latest Ref Range: 70 - 99 mg/dL 52 (L)   Calcium Latest Ref Range: 8.5 - 9.9 mg/dL 9.7   TSH Latest Ref Range: 0.440 - 3.860 uIU/mL 4.050 (H)   T4 Free Latest Ref Range: 0.84 - 1.68 ng/dL 1.27       Patient Active Problem List   Diagnosis    HTN (hypertension)    Hypercholesteremia    Mental retardation, moderate (I.Q. 35-49)    Obesity    Localized primary osteoarthritis of lower leg    Vitamin D deficiency    Depression    Other specified behavioral problem    Mixed sleep apnea    Thrombocytopenia due to drugs    Macrocytosis without anemia    Sinus tachycardia    History of Clostridium difficile infection    Kidney congenitally absent, right    Solitary cyst of kidney    Chronic orthostatic hypotension    Pulmonary aspiration, history of    Diastolic heart failure, NYHA class 1 (HCC)    Bipolar affective disorder, current episode manic with psychotic symptoms (HCC)    Bilateral edema of lower extremity    Lower Systems   Unable to perform ROS: Mental status change     Vitals:    19 1004   BP: 93/63   Pulse: 92   Weight: 198 lb (89.8 kg)   Height: 5' 2\" (1.575 m)     In wheelchair     Objective:   Physical Exam   Constitutional: She appears well-developed and well-nourished. HENT:   Head: Normocephalic and atraumatic. Neck: Neck supple. Cardiovascular: Normal rate. Abdominal:   Obese    Musculoskeletal: She exhibits edema. Neurological: She is alert. Assessment:       Diagnosis Orders   1. Hypothyroidism, unspecified type  T4, Free    TSH without Reflex   2.  Hyperkalemia  Basic Metabolic Panel            Plan:       Orders Placed This Encounter   Procedures    Basic Metabolic Panel     Standing Status:   Future     Standing Expiration Date:   2020    T4, Free     Standing Status:   Future     Standing Expiration Date:   2020    TSH without Reflex     Standing Status:   Future     Standing Expiration Date:   2020     Orders Placed This Encounter   Medications    levothyroxine (SYNTHROID) 50 MCG tablet     Si tablet by PEG Tube route Daily     Dispense:  30 tablet     Refill:  5     Discontinue p.o. potassium supplement repeat labs in couple of months increased dose of Synthroid to 50 mcg daily  Medications Discontinued During This Encounter   Medication Reason    potassium chloride 20 MEQ/15ML (10%) oral solution Therapy completed    levothyroxine (SYNTHROID) 25 MCG tablet REORDER

## 2019-06-19 LAB
BACTERIA: ABNORMAL /HPF
BILIRUBIN URINE: NEGATIVE
BLOOD, URINE: ABNORMAL
CLARITY: ABNORMAL
COLOR: YELLOW
EPITHELIAL CELLS, UA: ABNORMAL /HPF (ref 0–5)
GLUCOSE URINE: NEGATIVE MG/DL
HYALINE CASTS: ABNORMAL /HPF (ref 0–5)
KETONES, URINE: NEGATIVE MG/DL
LEUKOCYTE ESTERASE, URINE: ABNORMAL
NITRITE, URINE: NEGATIVE
PH UA: 7.5 (ref 5–9)
PROTEIN UA: NEGATIVE MG/DL
RBC UA: ABNORMAL /HPF (ref 0–5)
SPECIFIC GRAVITY UA: 1.01 (ref 1–1.03)
UROBILINOGEN, URINE: 0.2 E.U./DL
WBC UA: >100 /HPF (ref 0–5)

## 2019-06-21 ENCOUNTER — HOSPITAL ENCOUNTER (OUTPATIENT)
Dept: INFUSION THERAPY | Age: 59
Setting detail: INFUSION SERIES
Discharge: HOME | End: 2019-06-21
Payer: MEDICARE

## 2019-06-21 PROCEDURE — 99211 OFF/OP EST MAY X REQ PHY/QHP: CPT

## 2019-06-21 PROCEDURE — 43762 RPLC GTUBE NO REVJ TRC: CPT | Performed by: SURGERY

## 2019-06-21 NOTE — PROGRESS NOTES
Pt was here at [facility] via w/c, with caregiver, for Peg tube change. Dr. Kika Rahman performed the procedure. Pt left via w/c.

## 2019-06-22 NOTE — OP NOTE
[address]                      [address], [address] [facility]                                OPERATIVE REPORT    PATIENT NAME: Qasim Mcdaniel                       :        1960  MED REC NO:   [id number]                            ROOM:  ACCOUNT NO:   [de-identified]                           ADMIT DATE: 2019  PROVIDER:     Kinsey Rivas MD    DATE OF PROCEDURE:  2019    PREOPERATIVE DIAGNOSES:  Mechanical complication of gastrostomy tube and  dysphagia. POSTOPERATIVE DIAGNOSES:  Mechanical complication of gastrostomy tube  and dysphagia. OPERATION PERFORMED:  Change of gastrostomy tube. SURGEON:  Kinsey Rivas M.D. ANESTHESIA:  None. EBL:  None. PROCEDURE NOTE:  The patient was brought to the outpatient infusion  area. She remained in her chair. The old G tube was removed intact and  discarded. A new 20-Romansh disc style tube was lubricated and inserted  through the tract uneventfully. The tube was flushed x2. Return of  gastric contents was noted. The site was dressed with gauze. She left  the suite in good condition.         Aurora Penaloza MD    D: 2019 9:47:32       T: 2019 9:58:15     [id number]  Job#: [id number]     Doc#: [id number]    CC:

## 2019-08-01 ENCOUNTER — CARE COORDINATION (OUTPATIENT)
Dept: CARE COORDINATION | Age: 59
End: 2019-08-01

## 2019-08-12 ENCOUNTER — OFFICE VISIT (OUTPATIENT)
Dept: ENDOCRINOLOGY | Age: 59
End: 2019-08-12
Payer: MEDICARE

## 2019-08-12 VITALS
WEIGHT: 198 LBS | DIASTOLIC BLOOD PRESSURE: 76 MMHG | BODY MASS INDEX: 36.44 KG/M2 | HEIGHT: 62 IN | SYSTOLIC BLOOD PRESSURE: 108 MMHG | HEART RATE: 97 BPM

## 2019-08-12 DIAGNOSIS — E03.9 HYPOTHYROIDISM, UNSPECIFIED TYPE: Primary | ICD-10-CM

## 2019-08-12 PROCEDURE — G8417 CALC BMI ABV UP PARAM F/U: HCPCS | Performed by: INTERNAL MEDICINE

## 2019-08-12 PROCEDURE — G8427 DOCREV CUR MEDS BY ELIG CLIN: HCPCS | Performed by: INTERNAL MEDICINE

## 2019-08-12 PROCEDURE — 99213 OFFICE O/P EST LOW 20 MIN: CPT | Performed by: INTERNAL MEDICINE

## 2019-08-12 PROCEDURE — 3017F COLORECTAL CA SCREEN DOC REV: CPT | Performed by: INTERNAL MEDICINE

## 2019-08-12 PROCEDURE — 1036F TOBACCO NON-USER: CPT | Performed by: INTERNAL MEDICINE

## 2019-08-21 RX ORDER — CARVEDILOL 6.25 MG/1
TABLET ORAL
COMMUNITY
Start: 2018-03-13 | End: 2019-10-01 | Stop reason: ALTCHOICE

## 2019-08-21 RX ORDER — POTASSIUM CHLORIDE 20MEQ/15ML
LIQUID (ML) ORAL
COMMUNITY
Start: 2019-04-28

## 2019-08-21 RX ORDER — CLOZAPINE 50 MG/1
TABLET ORAL
COMMUNITY
Start: 2018-04-05 | End: 2019-10-04 | Stop reason: DRUGHIGH

## 2019-08-21 RX ORDER — SPIRONOLACTONE 25 MG/1
TABLET ORAL
COMMUNITY
Start: 2019-04-29

## 2019-08-22 LAB
BASOPHILS ABSOLUTE: 0 K/UL (ref 0–0.2)
BASOPHILS RELATIVE PERCENT: 0.4 %
EOSINOPHILS ABSOLUTE: 0.1 K/UL (ref 0–0.7)
EOSINOPHILS RELATIVE PERCENT: 1.5 %
HCT VFR BLD CALC: 41.9 % (ref 37–47)
HEMOGLOBIN: 13.8 G/DL (ref 12–16)
LYMPHOCYTES ABSOLUTE: 1.6 K/UL (ref 1–4.8)
LYMPHOCYTES RELATIVE PERCENT: 21.7 %
MCH RBC QN AUTO: 33.8 PG (ref 27–31.3)
MCHC RBC AUTO-ENTMCNC: 33.1 % (ref 33–37)
MCV RBC AUTO: 102.2 FL (ref 82–100)
MONOCYTES ABSOLUTE: 1 K/UL (ref 0.2–0.8)
MONOCYTES RELATIVE PERCENT: 13.9 %
NEUTROPHILS ABSOLUTE: 4.6 K/UL (ref 1.4–6.5)
NEUTROPHILS RELATIVE PERCENT: 62.5 %
PDW BLD-RTO: 13.5 % (ref 11.5–14.5)
PLATELET # BLD: 99 K/UL (ref 130–400)
PLATELET SLIDE REVIEW: ABNORMAL
RBC # BLD: 4.1 M/UL (ref 4.2–5.4)
T4 FREE: 1.09 NG/DL (ref 0.84–1.68)
TSH SERPL DL<=0.05 MIU/L-ACNC: 2.73 UIU/ML (ref 0.44–3.86)
WBC # BLD: 7.4 K/UL (ref 4.8–10.8)

## 2019-09-05 ENCOUNTER — OFFICE VISIT (OUTPATIENT)
Dept: FAMILY MEDICINE CLINIC | Age: 59
End: 2019-09-05
Payer: MEDICARE

## 2019-09-05 ENCOUNTER — HOSPITAL ENCOUNTER (OUTPATIENT)
Dept: GENERAL RADIOLOGY | Age: 59
Discharge: HOME | End: 2019-09-07
Payer: MEDICARE

## 2019-09-05 VITALS
OXYGEN SATURATION: 98 % | TEMPERATURE: 99.2 F | SYSTOLIC BLOOD PRESSURE: 108 MMHG | HEIGHT: 62 IN | DIASTOLIC BLOOD PRESSURE: 76 MMHG | WEIGHT: 198 LBS | HEART RATE: 92 BPM | RESPIRATION RATE: 16 BRPM | BODY MASS INDEX: 36.44 KG/M2

## 2019-09-05 DIAGNOSIS — R05.9 COUGH: ICD-10-CM

## 2019-09-05 DIAGNOSIS — J18.9 COMMUNITY ACQUIRED PNEUMONIA OF RIGHT LOWER LOBE OF LUNG: Primary | ICD-10-CM

## 2019-09-05 PROCEDURE — 3017F COLORECTAL CA SCREEN DOC REV: CPT | Performed by: NURSE PRACTITIONER

## 2019-09-05 PROCEDURE — G8417 CALC BMI ABV UP PARAM F/U: HCPCS | Performed by: NURSE PRACTITIONER

## 2019-09-05 PROCEDURE — 71046 X-RAY EXAM CHEST 2 VIEWS: CPT

## 2019-09-05 PROCEDURE — 99214 OFFICE O/P EST MOD 30 MIN: CPT | Performed by: NURSE PRACTITIONER

## 2019-09-05 PROCEDURE — G8427 DOCREV CUR MEDS BY ELIG CLIN: HCPCS | Performed by: NURSE PRACTITIONER

## 2019-09-05 PROCEDURE — 1036F TOBACCO NON-USER: CPT | Performed by: NURSE PRACTITIONER

## 2019-09-05 RX ORDER — DOXYCYCLINE HYCLATE 100 MG
100 TABLET ORAL 2 TIMES DAILY
Qty: 20 TABLET | Refills: 0 | Status: SHIPPED | OUTPATIENT
Start: 2019-09-05 | End: 2019-09-15

## 2019-09-05 ASSESSMENT — ENCOUNTER SYMPTOMS
RHINORRHEA: 0
WHEEZING: 0
COUGH: 1
SHORTNESS OF BREATH: 0

## 2019-09-05 NOTE — PROGRESS NOTES
Subjective:      Patient ID: Kristi Alvarez is a 60 y.o. female who presents today with a complaint of   Chief Complaint   Patient presents with    Chest Congestion     Brought in by MARCEL Ruiz from [facility]   Was at nursing home, had slight cough, irritability and pulse ox was \"a little below 90\"      Cough   This is a new problem. The current episode started yesterday. The problem has been unchanged. Pertinent negatives include no ear congestion, ear pain, fever, nasal congestion, rhinorrhea, shortness of breath or wheezing. She has tried nothing for the symptoms. There is no history of asthma or COPD.        Past Medical History:   Diagnosis Date    Bipolar affective disorder, current episode manic with psychotic symptoms ([address] 08/19/2013    Dr Isabel Castro Diastolic heart failure, NYHA class 1 ([address] 2015    2 D Echo, impaired relaxation, EF 50%    Dysphagia, oropharyngeal phase     Elevated diaphragm 2016    R side    History of Clostridium difficile colitis 1/2014    fecal transplant    Horseshoe kidney 2014, 2016    Kidney congenitally absent, right     CT 2014    Left knee DJD 2013    severe, Dr Vicky Lisa    Localized, primary osteoarthritis of lower leg     Lower extremity pain, bilateral     Lymphedema of both lower extremities     lymphedema tarda    Mental retardation, moderate (I.Q. 35-49)     Mixed sleep apnea     Morbid obesity with alveolar hypoventilation (HCC)     Multiple renal cysts 2014    per CT, one kidney    Nocturnal hypoxemia due to obesity     Other primary thrombocytopenia (HCC)     Other specified behavioral problem     PEG (percutaneous endoscopic gastrostomy) status ([address] 2013    due to aspiration pneumonia (water[facility]meds)    Pulmonary aspiration, history of     S/P colonoscopy 2011    normal    Sinus tachycardia     Vitamin D deficiency      Past Surgical History:   Procedure Laterality Date    GASTROSTOMY TUBE CHANGE  04/08/15    GASTROSTOMY TUBE

## 2019-09-10 ENCOUNTER — OFFICE VISIT (OUTPATIENT)
Dept: INTERNAL MEDICINE CLINIC | Age: 59
End: 2019-09-10
Payer: MEDICARE

## 2019-09-10 ENCOUNTER — HOSPITAL ENCOUNTER (OUTPATIENT)
Dept: GENERAL RADIOLOGY | Age: 59
Discharge: HOME | End: 2019-09-12
Payer: MEDICARE

## 2019-09-10 VITALS
WEIGHT: 193 LBS | HEART RATE: 91 BPM | OXYGEN SATURATION: 91 % | DIASTOLIC BLOOD PRESSURE: 79 MMHG | RESPIRATION RATE: 18 BRPM | SYSTOLIC BLOOD PRESSURE: 112 MMHG | TEMPERATURE: 98.6 F | BODY MASS INDEX: 35.51 KG/M2 | HEIGHT: 62 IN

## 2019-09-10 DIAGNOSIS — J18.9 COMMUNITY ACQUIRED PNEUMONIA OF RIGHT LOWER LOBE OF LUNG: Primary | ICD-10-CM

## 2019-09-10 DIAGNOSIS — R05.9 COUGH: ICD-10-CM

## 2019-09-10 DIAGNOSIS — J18.9 COMMUNITY ACQUIRED PNEUMONIA OF RIGHT LOWER LOBE OF LUNG: ICD-10-CM

## 2019-09-10 PROCEDURE — 71046 X-RAY EXAM CHEST 2 VIEWS: CPT

## 2019-09-10 PROCEDURE — 1036F TOBACCO NON-USER: CPT | Performed by: FAMILY MEDICINE

## 2019-09-10 PROCEDURE — G8417 CALC BMI ABV UP PARAM F/U: HCPCS | Performed by: FAMILY MEDICINE

## 2019-09-10 PROCEDURE — G8427 DOCREV CUR MEDS BY ELIG CLIN: HCPCS | Performed by: FAMILY MEDICINE

## 2019-09-10 PROCEDURE — 3017F COLORECTAL CA SCREEN DOC REV: CPT | Performed by: FAMILY MEDICINE

## 2019-09-10 PROCEDURE — 99213 OFFICE O/P EST LOW 20 MIN: CPT | Performed by: FAMILY MEDICINE

## 2019-09-10 NOTE — PROGRESS NOTES
Subjective:      Patient ID: Marc Stewart is a 60 y.o. female who presents for:  Chief Complaint   Patient presents with    Pneumonia     follow up      Patient was seen and examined in the office today as she presents for a follow-up visit for CAP. She was recently seen and treated for CAP and her Caregiver, who accompanied her to the office today, reports that she is doing fine now but still coughs intermittently. No associated fever or hemoptysis.     Past Medical History:   Diagnosis Date    Bipolar affective disorder, current episode manic with psychotic symptoms ([facility] 08/19/2013    Dr Frieda Harada Diastolic heart failure, NYHA class 1 ([address] 2015    2 D Echo, impaired relaxation, EF 50%    Dysphagia, oropharyngeal phase     Elevated diaphragm 2016    R side    History of Clostridium difficile colitis 1/2014    fecal transplant    Horseshoe kidney 2014, 2016    Kidney congenitally absent, right     CT 2014    Left knee DJD 2013    severe, Dr Chencho Nagel    Localized, primary osteoarthritis of lower leg     Lower extremity pain, bilateral     Lymphedema of both lower extremities     lymphedema tarda    Mental retardation, moderate (I.Q. 35-49)     Mixed sleep apnea     Morbid obesity with alveolar hypoventilation (HCC)     Multiple renal cysts 2014    per CT, one kidney    Nocturnal hypoxemia due to obesity     Other primary thrombocytopenia (HCC)     Other specified behavioral problem     PEG (percutaneous endoscopic gastrostomy) status ([facility] 2013    due to aspiration pneumonia (water[facility]meds)    Pulmonary aspiration, history of     S/P colonoscopy 2011    normal    Sinus tachycardia     Vitamin D deficiency      Past Surgical History:   Procedure Laterality Date    GASTROSTOMY TUBE CHANGE  04/08/15    GASTROSTOMY TUBE PLACEMENT  8/13/13    Dr Viral Sue HISTORY  7/9/14    Change of G tube     Social History     Socioeconomic History    Marital status: Single

## 2019-09-11 ENCOUNTER — TELEPHONE (OUTPATIENT)
Dept: GERIATRIC MEDICINE | Age: 59
End: 2019-09-11

## 2019-09-12 NOTE — TELEPHONE ENCOUNTER
The area of possible infection in her right lung is still there and seems to have no appreciable change and she should continue to take her ABX as prescribed. However, we might need to obtain CT scan of her lungs in order to make a definitive diagnosis (and treatment)!

## 2019-09-19 ENCOUNTER — HOSPITAL ENCOUNTER (OUTPATIENT)
Dept: GENERAL RADIOLOGY | Age: 59
Discharge: HOME | End: 2019-09-21
Payer: MEDICARE

## 2019-09-19 ENCOUNTER — OFFICE VISIT (OUTPATIENT)
Dept: INTERNAL MEDICINE CLINIC | Age: 59
End: 2019-09-19
Payer: MEDICARE

## 2019-09-19 VITALS
SYSTOLIC BLOOD PRESSURE: 115 MMHG | DIASTOLIC BLOOD PRESSURE: 69 MMHG | HEIGHT: 62 IN | RESPIRATION RATE: 18 BRPM | HEART RATE: 102 BPM | BODY MASS INDEX: 35.3 KG/M2 | OXYGEN SATURATION: 91 %

## 2019-09-19 DIAGNOSIS — J18.9 COMMUNITY ACQUIRED PNEUMONIA OF RIGHT LOWER LOBE OF LUNG: ICD-10-CM

## 2019-09-19 DIAGNOSIS — J18.9 COMMUNITY ACQUIRED PNEUMONIA OF RIGHT LOWER LOBE OF LUNG: Primary | ICD-10-CM

## 2019-09-19 DIAGNOSIS — J20.9 BRONCHITIS, ACUTE, WITH BRONCHOSPASM: ICD-10-CM

## 2019-09-19 DIAGNOSIS — R06.2 WHEEZING: ICD-10-CM

## 2019-09-19 DIAGNOSIS — J98.01 COUGH DUE TO BRONCHOSPASM: ICD-10-CM

## 2019-09-19 PROCEDURE — G8427 DOCREV CUR MEDS BY ELIG CLIN: HCPCS | Performed by: FAMILY MEDICINE

## 2019-09-19 PROCEDURE — 1036F TOBACCO NON-USER: CPT | Performed by: FAMILY MEDICINE

## 2019-09-19 PROCEDURE — G8417 CALC BMI ABV UP PARAM F/U: HCPCS | Performed by: FAMILY MEDICINE

## 2019-09-19 PROCEDURE — 3017F COLORECTAL CA SCREEN DOC REV: CPT | Performed by: FAMILY MEDICINE

## 2019-09-19 PROCEDURE — 99213 OFFICE O/P EST LOW 20 MIN: CPT | Performed by: FAMILY MEDICINE

## 2019-09-19 PROCEDURE — 71046 X-RAY EXAM CHEST 2 VIEWS: CPT

## 2019-09-19 RX ORDER — ALBUTEROL SULFATE 2.5 MG/3ML
2.5 SOLUTION RESPIRATORY (INHALATION) ONCE
Status: COMPLETED | OUTPATIENT
Start: 2019-09-19 | End: 2019-09-19

## 2019-09-19 RX ORDER — ALBUTEROL SULFATE 2.5 MG/3ML
2.5 SOLUTION RESPIRATORY (INHALATION) EVERY 6 HOURS PRN
Qty: 120 EACH | Refills: 1 | Status: SHIPPED | OUTPATIENT
Start: 2019-09-19

## 2019-09-19 RX ORDER — CLOZAPINE 100 MG/1
TABLET ORAL
COMMUNITY
Start: 2019-09-09

## 2019-09-19 RX ADMIN — ALBUTEROL SULFATE 2.5 MG: 2.5 SOLUTION RESPIRATORY (INHALATION) at 10:54

## 2019-09-24 ENCOUNTER — TELEPHONE (OUTPATIENT)
Dept: INTERNAL MEDICINE CLINIC | Age: 59
End: 2019-09-24

## 2019-09-27 ENCOUNTER — OFFICE VISIT (OUTPATIENT)
Dept: INTERNAL MEDICINE CLINIC | Age: 59
End: 2019-09-27
Payer: MEDICARE

## 2019-09-27 ENCOUNTER — HOSPITAL ENCOUNTER (EMERGENCY)
Age: 59
Discharge: HOME | End: 2019-09-27
Attending: EMERGENCY MEDICINE
Payer: MEDICARE

## 2019-09-27 ENCOUNTER — APPOINTMENT (OUTPATIENT)
Dept: GENERAL RADIOLOGY | Age: 59
End: 2019-09-27
Payer: MEDICARE

## 2019-09-27 VITALS
TEMPERATURE: 97.6 F | BODY MASS INDEX: 35.3 KG/M2 | DIASTOLIC BLOOD PRESSURE: 67 MMHG | SYSTOLIC BLOOD PRESSURE: 117 MMHG | OXYGEN SATURATION: 90 % | WEIGHT: 193 LBS | RESPIRATION RATE: 16 BRPM | HEART RATE: 101 BPM

## 2019-09-27 VITALS — SYSTOLIC BLOOD PRESSURE: 124 MMHG | HEART RATE: 102 BPM | DIASTOLIC BLOOD PRESSURE: 70 MMHG | OXYGEN SATURATION: 90 %

## 2019-09-27 DIAGNOSIS — R13.12 OROPHARYNGEAL DYSPHAGIA: ICD-10-CM

## 2019-09-27 DIAGNOSIS — R05.9 COUGH: ICD-10-CM

## 2019-09-27 DIAGNOSIS — J98.11 ATELECTASIS: Primary | ICD-10-CM

## 2019-09-27 DIAGNOSIS — R91.8 PULMONARY MASS: ICD-10-CM

## 2019-09-27 DIAGNOSIS — G47.39 MIXED SLEEP APNEA: ICD-10-CM

## 2019-09-27 DIAGNOSIS — Z23 INFLUENZA VACCINE NEEDED: ICD-10-CM

## 2019-09-27 DIAGNOSIS — R05.9 COUGH: Primary | ICD-10-CM

## 2019-09-27 DIAGNOSIS — F32.5 MAJOR DEPRESSIVE DISORDER IN REMISSION, UNSPECIFIED WHETHER RECURRENT (HCC): ICD-10-CM

## 2019-09-27 LAB
ALBUMIN SERPL-MCNC: 3.5 G/DL (ref 3.5–4.6)
ALP BLD-CCNC: 89 U/L (ref 40–130)
ALT SERPL-CCNC: 9 U/L (ref 0–33)
ANION GAP SERPL CALCULATED.3IONS-SCNC: 9 MEQ/L (ref 9–15)
AST SERPL-CCNC: 27 U/L (ref 0–35)
BASOPHILS ABSOLUTE: 0 K/UL (ref 0–0.2)
BASOPHILS RELATIVE PERCENT: 0.5 %
BILIRUB SERPL-MCNC: 0.3 MG/DL (ref 0.2–0.7)
BUN BLDV-MCNC: 28 MG/DL (ref 6–20)
CALCIUM SERPL-MCNC: 9.4 MG/DL (ref 8.5–9.9)
CHLORIDE BLD-SCNC: 88 MEQ/L (ref 95–107)
CO2: 33 MEQ/L (ref 20–31)
CREAT SERPL-MCNC: 0.69 MG/DL (ref 0.5–0.9)
EOSINOPHILS ABSOLUTE: 0.1 K/UL (ref 0–0.7)
EOSINOPHILS RELATIVE PERCENT: 0.6 %
GFR AFRICAN AMERICAN: >60
GFR NON-AFRICAN AMERICAN: >60
GLOBULIN: 4.4 G/DL (ref 2.3–3.5)
GLUCOSE BLD-MCNC: 98 MG/DL (ref 70–99)
HCT VFR BLD CALC: 44.5 % (ref 37–47)
HEMOGLOBIN: 14.8 G/DL (ref 12–16)
LACTIC ACID: 0.9 MMOL/L (ref 0.5–2.2)
LYMPHOCYTES ABSOLUTE: 1.8 K/UL (ref 1–4.8)
LYMPHOCYTES RELATIVE PERCENT: 20.6 %
MCH RBC QN AUTO: 33.4 PG (ref 27–31.3)
MCHC RBC AUTO-ENTMCNC: 33.2 % (ref 33–37)
MCV RBC AUTO: 100.5 FL (ref 82–100)
MONOCYTES ABSOLUTE: 1 K/UL (ref 0.2–0.8)
MONOCYTES RELATIVE PERCENT: 11.2 %
NEUTROPHILS ABSOLUTE: 5.9 K/UL (ref 1.4–6.5)
NEUTROPHILS RELATIVE PERCENT: 67.1 %
PDW BLD-RTO: 13.4 % (ref 11.5–14.5)
PLATELET # BLD: 112 K/UL (ref 130–400)
POTASSIUM SERPL-SCNC: 4.5 MEQ/L (ref 3.4–4.9)
RBC # BLD: 4.43 M/UL (ref 4.2–5.4)
SODIUM BLD-SCNC: 130 MEQ/L (ref 135–144)
TOTAL PROTEIN: 7.9 G/DL (ref 6.3–8)
WBC # BLD: 8.7 K/UL (ref 4.8–10.8)

## 2019-09-27 PROCEDURE — 36415 COLL VENOUS BLD VENIPUNCTURE: CPT

## 2019-09-27 PROCEDURE — 71046 X-RAY EXAM CHEST 2 VIEWS: CPT

## 2019-09-27 PROCEDURE — 99213 OFFICE O/P EST LOW 20 MIN: CPT | Performed by: INTERNAL MEDICINE

## 2019-09-27 PROCEDURE — 83605 ASSAY OF LACTIC ACID: CPT

## 2019-09-27 PROCEDURE — G0008 ADMIN INFLUENZA VIRUS VAC: HCPCS | Performed by: INTERNAL MEDICINE

## 2019-09-27 PROCEDURE — 80053 COMPREHEN METABOLIC PANEL: CPT

## 2019-09-27 PROCEDURE — 1036F TOBACCO NON-USER: CPT | Performed by: INTERNAL MEDICINE

## 2019-09-27 PROCEDURE — 90686 IIV4 VACC NO PRSV 0.5 ML IM: CPT | Performed by: INTERNAL MEDICINE

## 2019-09-27 PROCEDURE — G8417 CALC BMI ABV UP PARAM F/U: HCPCS | Performed by: INTERNAL MEDICINE

## 2019-09-27 PROCEDURE — 87040 BLOOD CULTURE FOR BACTERIA: CPT

## 2019-09-27 PROCEDURE — 99283 EMERGENCY DEPT VISIT LOW MDM: CPT

## 2019-09-27 PROCEDURE — G8427 DOCREV CUR MEDS BY ELIG CLIN: HCPCS | Performed by: INTERNAL MEDICINE

## 2019-09-27 PROCEDURE — 85025 COMPLETE CBC W/AUTO DIFF WBC: CPT

## 2019-09-27 PROCEDURE — 3017F COLORECTAL CA SCREEN DOC REV: CPT | Performed by: INTERNAL MEDICINE

## 2019-09-27 ASSESSMENT — ENCOUNTER SYMPTOMS
WHEEZING: 0
SHORTNESS OF BREATH: 1
EYE REDNESS: 0
BLOOD IN STOOL: 0
COUGH: 1
COUGH: 1
VOICE CHANGE: 0

## 2019-09-27 NOTE — ED PROVIDER NOTES
(MILK OF MAGNESIA) 400 MG/5ML SUSPENSION    Give 30 cc per PEG x 1 if no stool x 2 consecutive days    NEBULIZERS (COMPRESSOR/NEBULIZER) MISC    Use as directed on the package    NUTRITIONAL SUPPLEMENTS (JEVITY 1.2 KENA/FIBER) LIQD    Give 3 cans per peg daily    NYSTATIN (MYCOSTATIN) 506836 UNIT/GM CREAM    Apply topically 2 times daily. OLANZAPINE (ZYPREXA) 2.5 MG TABLET    Take 2.5 mg by mouth every morning    OLANZAPINE (ZYPREXA) 7.5 MG TABLET    Take 7.5 mg by mouth nightly     OXYGEN    Nocturnal, use as directed    POTASSIUM CHLORIDE 20 MEQ/15ML (10%) ORAL SOLUTION        RESPIRATORY THERAPY SUPPLIES (NEBULIZER/ADULT MASK) KIT    Use as directed on the package    SPIRONOLACTONE (ALDACTONE) 25 MG TABLET        VALPROATE (DEPAKENE) 250 MG/5ML SOLUTION    250 mg by Per G Tube route every morning Along with 750 mg via G tube bid @ 4 pm & 8 pm    VALPROIC ACID (DEPAKENE) 250 MG CAPSULE    Take 250 mg by mouth 4 times daily       ALLERGIES     Patient has no known allergies.     FAMILY HISTORY       Family History   Family history unknown: Yes          SOCIAL HISTORY       Social History     Socioeconomic History    Marital status: Single     Spouse name: Not on file    Number of children: 0    Years of education: Not on file    Highest education level: Not on file   Occupational History    Occupation: SSI   Social Needs    Financial resource strain: Not on file    Food insecurity:     Worry: Not on file     Inability: Not on file    Transportation needs:     Medical: Not on file     Non-medical: Not on file   Tobacco Use    Smoking status: Never Smoker    Smokeless tobacco: Never Used   Substance and Sexual Activity    Alcohol use: No     Alcohol/week: 0.0 standard drinks    Drug use: No    Sexual activity: Not Currently   Lifestyle    Physical activity:     Days per week: Not on file     Minutes per session: Not on file    Stress: Not on file   Relationships    Social connections:     Talks on RADIOLOGY:   Non-plain filmimages such as CT, Ultrasound and MRI are read by the radiologist. Plain radiographic images are visualized and preliminarily interpreted by the emergency physician with the below findings:    See rad report    Interpretation per the Radiologist below, if available at the time ofthis note:    XR CHEST STANDARD (2 VW)   Final Result            ED BEDSIDE ULTRASOUND:   Performed by ED Physician - none    LABS:  Labs Reviewed   COMPREHENSIVE METABOLIC PANEL - Abnormal; Notable for the following components:       Result Value    Sodium 130 (*)     Chloride 88 (*)     CO2 33 (*)     BUN 28 (*)     Globulin 4.4 (*)     All other components within normal limits   CBC WITH AUTO DIFFERENTIAL - Abnormal; Notable for the following components:    .5 (*)     MCH 33.4 (*)     Platelets 501 (*)     Monocytes Absolute 1.0 (*)     All other components within normal limits   CULTURE BLOOD #2   CULTURE BLOOD #1   LACTIC ACID, PLASMA       All other labs were within normal range or not returned as of this dictation. EMERGENCY DEPARTMENT COURSE and DIFFERENTIAL DIAGNOSIS/MDM:   Vitals:    Vitals:    09/27/19 1558 09/27/19 1711 09/27/19 1839   BP: 115/68  117/67   Pulse: 106 92 101   Resp: 24 16 16   Temp: 97.6 °F (36.4 °C)     TempSrc: Oral     SpO2: (!) 88% 94% 90%   Weight: 193 lb (87.5 kg)              MDM  Number of Diagnoses or Management Options  Atelectasis:   Cough:   Diagnosis management comments: Patient on room air in emergency room she will not wear the oxygen. Pulse ox 88 to 90%. Dr. Natacha Millan evaluates patient in ER. he discussed with Dr. Jonathon Torres. they agree the patient can discharge to group home with incentive spirometer. She is to follow-up with pulmonary and internal medicine. Caregiver at bedside notes they are attempting to reinstitute her nighttime oxygen. They states patient did not keep her oxygen on at night hence discontinued.   Patient to follow-up with primary

## 2019-09-27 NOTE — PROGRESS NOTES
Tahir Lino is a 60 y.o. female with intellectual disability with behavioral disturbances, depression, lymphedema of lower extremities, PEG status due to recurrent aspiration, obesity/hyperventilation/nocturnal hypoxemia, who presents for evaluation of:     Chief Complaint   Patient presents with    Sleep Apnea     machine to be removed per agency due to need for testing, patient unable ot comply    Dysphagia     recurrent cough when having pleasure foods    Abnormal Test Results     After completion of outpatient treatment of community-acquired pneumonia, patient's chest x-ray fails to show improvement, there is a suspicion of right lung mass    Immunizations       Interim history: Since her last office visit with me almost 4 months ago, the patient has seen the endocrinologist, her neuropsychiatrist, and less than 2 months ago she was seen in the office and treated for community-acquired pneumonia with a 10-day course of oral doxycycline which she completed. She does continue coughing and some episodes occur after she eats. The caregivers are concerned persistent chest x-ray abnormalities, patient's ability to tolerate CAT scan of the chest, and of the patient's ability to be qualified for home oxygen since she could not cooperate with the testing. Her oral intake of pleasure foods is closely monitored due to her history of aspiration.     The following laboratory reports since the past visit were reviewed (the ones pertinent to today's visit were discussed with the patient/ caregiver):    Orders Only on 09/27/2019   Component Date Value Ref Range Status    WBC 09/27/2019 7.6  4.8 - 10.8 K/uL Final    RBC 09/27/2019 4.27  4.20 - 5.40 M/uL Final    Hemoglobin 09/27/2019 14.4  12.0 - 16.0 g/dL Final    Hematocrit 09/27/2019 43.2  37.0 - 47.0 % Final    MCV 09/27/2019 101.3* 82.0 - 100.0 fL Final    MCH 09/27/2019 33.6* 27.0 - 31.3 pg Final    MCHC 09/27/2019 33.2  33.0 - 37.0 % Final    RDW 09/27/2019 Date    Bipolar affective disorder, current episode manic with psychotic symptoms ([facility] 08/19/2013    Dr Moy Stevens Diastolic heart failure, NYHA class 1 ([facility] 2015    2 D Echo, impaired relaxation, EF 50%    Dysphagia, oropharyngeal phase     Elevated diaphragm 2016    R side    History of Clostridium difficile colitis 1/2014    fecal transplant    Horseshoe kidney 2014, 2016    Kidney congenitally absent, right     CT 2014    Left knee DJD 2013    severe, Dr Crenshaw Blunt    Localized, primary osteoarthritis of lower leg     Lower extremity pain, bilateral     Lymphedema of both lower extremities     lymphedema tarda    Mental retardation, moderate (I.Q. 35-49)     Mixed sleep apnea     Morbid obesity with alveolar hypoventilation (HCC)     Multiple renal cysts 2014    per CT, one kidney    Nocturnal hypoxemia due to obesity     Other primary thrombocytopenia (HCC)     Other specified behavioral problem     [facility]stomy[facility] 2013    due to aspiration pneumonia (water[facility]meds)    Pulmonary aspiration, history of     S/P colonoscopy 2011    normal    Sinus tachycardia     Vitamin D deficiency        Past Surgical History:   Procedure Laterality Date    GASTROSTOMY TUBE CHANGE  04/08/15    GASTROSTOMY TUBE PLACEMENT  8/13/13    Dr Lisy Turpin  7/9/14    Change of G tube       Social History     Socioeconomic History    Marital status: Single     Spouse name: Not on file    Number of children: 0    Years of education: Not on file    Highest education level: Not on file   Occupational History    Occupation: SSI   Social Needs    Financial resource strain: Not on file    Food insecurity:     Worry: Not on file     Inability: Not on file    Transportation needs:     Medical: Not on file     Non-medical: Not on file   Tobacco Use    Smoking status: Never Smoker    Smokeless tobacco: Never Used   Substance and Sexual Activity [place]    Oropharyngeal dysphagia    Influenza vaccine needed  -     INFLUENZA, QUADV, 3 YRS AND OLDER, IM PF, PREFILL SYR OR SDV, 0.5ML (AFLURIA QUADV, PF)        Plan:    See all orders and comments in the assessment section. Reviewed with the patient (/and caregiver if present): current health status, medications, activities and diet. Today's diagnosis (in the context ofchronic problems) was discussed with patient (/and caregiver if present), questions answered. The current medical regimen is effective;  continue present plan and medications. Orders Placed This Encounter   Procedures    INFLUENZA, QUADV, 3 YRS AND OLDER, IM PF, PREFILL SYR OR SDV, 0.5ML (Kyrie Lemming, PF)   Lacey Bolton MD, Pulmonology, [facility]     Referral Priority:   Urgent     Referral Type:   Eval and Treat     Referral Reason:   Specialty Services Required     Referred to Provider:   Georgette Turner MD     Requested Specialty:   Pulmonology     Number of Visits Requested:   1       Close follow up needed to evaluate treatment results and for care coordination. Return in about 3 weeks (around 10/18/2019). I have reviewed the patient's medical and surgical, family and social history, health maintenance schedule, and updated the computerized patient record. Please note this report has been partially produced by using speech recognition hardware. It may contain errors related to the system, including grammar, punctuation and spelling as well as words and phrases that may seem inaccurate. For anyquestions or concerns, please feel free to contact me for clarification. Electronically signed by Pablo Lomas MD      Addendum     vaccine Information Sheet, \"Influenza - Inactivated\"  given to Anabela, or parent/legal guardian of  [place] and verbalized understanding. Patient responses:    Have you ever had a reaction to a flu vaccine? No  Are you able to eat eggs without adverse effects?   Yes  Do

## 2019-09-28 DIAGNOSIS — Z12.31 BREAST CANCER SCREENING BY MAMMOGRAM: Primary | ICD-10-CM

## 2019-10-01 ENCOUNTER — OFFICE VISIT (OUTPATIENT)
Dept: PULMONOLOGY | Age: 59
End: 2019-10-01
Payer: MEDICARE

## 2019-10-01 ENCOUNTER — TELEPHONE (OUTPATIENT)
Dept: INTERNAL MEDICINE CLINIC | Age: 59
End: 2019-10-01

## 2019-10-01 VITALS
BODY MASS INDEX: 35.51 KG/M2 | HEIGHT: 62 IN | SYSTOLIC BLOOD PRESSURE: 124 MMHG | OXYGEN SATURATION: 91 % | HEART RATE: 99 BPM | DIASTOLIC BLOOD PRESSURE: 70 MMHG | TEMPERATURE: 96.2 F | WEIGHT: 193 LBS | RESPIRATION RATE: 16 BRPM

## 2019-10-01 DIAGNOSIS — J98.11 ATELECTASIS OF RIGHT LUNG: ICD-10-CM

## 2019-10-01 DIAGNOSIS — G47.33 OSA (OBSTRUCTIVE SLEEP APNEA): ICD-10-CM

## 2019-10-01 DIAGNOSIS — R09.02 HYPOXIA: Primary | ICD-10-CM

## 2019-10-01 DIAGNOSIS — E66.9 OBESITY (BMI 30-39.9): ICD-10-CM

## 2019-10-01 PROCEDURE — 1036F TOBACCO NON-USER: CPT | Performed by: INTERNAL MEDICINE

## 2019-10-01 PROCEDURE — G8417 CALC BMI ABV UP PARAM F/U: HCPCS | Performed by: INTERNAL MEDICINE

## 2019-10-01 PROCEDURE — G8482 FLU IMMUNIZE ORDER/ADMIN: HCPCS | Performed by: INTERNAL MEDICINE

## 2019-10-01 PROCEDURE — 3017F COLORECTAL CA SCREEN DOC REV: CPT | Performed by: INTERNAL MEDICINE

## 2019-10-01 PROCEDURE — 99214 OFFICE O/P EST MOD 30 MIN: CPT | Performed by: INTERNAL MEDICINE

## 2019-10-01 PROCEDURE — G8427 DOCREV CUR MEDS BY ELIG CLIN: HCPCS | Performed by: INTERNAL MEDICINE

## 2019-10-01 ASSESSMENT — ENCOUNTER SYMPTOMS
WHEEZING: 0
COUGH: 0
SINUS PRESSURE: 0
EYE DISCHARGE: 0
TROUBLE SWALLOWING: 0
CHEST TIGHTNESS: 0
NAUSEA: 0
DIARRHEA: 0
ABDOMINAL PAIN: 0
EYE ITCHING: 0
SHORTNESS OF BREATH: 0
RHINORRHEA: 0
VOICE CHANGE: 0
SORE THROAT: 0
VOMITING: 0

## 2019-10-02 ENCOUNTER — TELEPHONE (OUTPATIENT)
Dept: INTERNAL MEDICINE CLINIC | Age: 59
End: 2019-10-02

## 2019-10-02 LAB
BLOOD CULTURE, ROUTINE: NORMAL
CULTURE, BLOOD 2: NORMAL

## 2019-10-04 ENCOUNTER — TELEPHONE (OUTPATIENT)
Dept: INTERNAL MEDICINE CLINIC | Age: 59
End: 2019-10-04

## 2019-10-04 ENCOUNTER — OFFICE VISIT (OUTPATIENT)
Dept: INTERNAL MEDICINE CLINIC | Age: 59
End: 2019-10-04
Payer: MEDICARE

## 2019-10-04 VITALS — HEART RATE: 98 BPM | DIASTOLIC BLOOD PRESSURE: 79 MMHG | OXYGEN SATURATION: 90 % | SYSTOLIC BLOOD PRESSURE: 125 MMHG

## 2019-10-04 DIAGNOSIS — G47.34 NOCTURNAL OXYGEN DESATURATION: Primary | ICD-10-CM

## 2019-10-04 DIAGNOSIS — E66.2: ICD-10-CM

## 2019-10-04 DIAGNOSIS — R13.12 DYSPHAGIA, OROPHARYNGEAL: ICD-10-CM

## 2019-10-04 PROCEDURE — 99213 OFFICE O/P EST LOW 20 MIN: CPT | Performed by: INTERNAL MEDICINE

## 2019-10-04 PROCEDURE — 1036F TOBACCO NON-USER: CPT | Performed by: INTERNAL MEDICINE

## 2019-10-04 PROCEDURE — G8417 CALC BMI ABV UP PARAM F/U: HCPCS | Performed by: INTERNAL MEDICINE

## 2019-10-04 PROCEDURE — G8482 FLU IMMUNIZE ORDER/ADMIN: HCPCS | Performed by: INTERNAL MEDICINE

## 2019-10-04 PROCEDURE — G8427 DOCREV CUR MEDS BY ELIG CLIN: HCPCS | Performed by: INTERNAL MEDICINE

## 2019-10-04 PROCEDURE — 3017F COLORECTAL CA SCREEN DOC REV: CPT | Performed by: INTERNAL MEDICINE

## 2019-10-04 ASSESSMENT — ENCOUNTER SYMPTOMS
SHORTNESS OF BREATH: 1
EYE DISCHARGE: 0
TROUBLE SWALLOWING: 1
BLOOD IN STOOL: 0
ABDOMINAL DISTENTION: 0
VOMITING: 0

## 2019-10-09 LAB
T4 FREE: 1.25 NG/DL (ref 0.84–1.68)
TSH SERPL DL<=0.05 MIU/L-ACNC: 2.96 UIU/ML (ref 0.44–3.86)

## 2023-05-23 NOTE — PROGRESS NOTES
She is active. No distress. HENT:   Head: Normocephalic and atraumatic. Right Ear: Hearing, tympanic membrane, external ear and ear canal normal. No drainage, swelling or tenderness. No foreign bodies. No mastoid tenderness. Tympanic membrane is not perforated, not erythematous, not retracted and not bulging. No middle ear effusion. No decreased hearing is noted. Left Ear: Hearing, tympanic membrane, external ear and ear canal normal. No drainage, swelling or tenderness. No foreign bodies. No mastoid tenderness. Tympanic membrane is not perforated, not erythematous, not retracted and not bulging. No middle ear effusion. No decreased hearing is noted. Nose: Rhinorrhea present. Right sinus exhibits no maxillary sinus tenderness and no frontal sinus tenderness. Left sinus exhibits no maxillary sinus tenderness and no frontal sinus tenderness. Mouth/Throat: Uvula is midline and mucous membranes are normal. No oral lesions. Posterior oropharyngeal erythema present. No oropharyngeal exudate or posterior oropharyngeal edema. Eyes: Conjunctivae and EOM are normal. Right eye exhibits no discharge. Left eye exhibits no discharge. Neck: Normal range of motion. Neck supple. Cardiovascular: Normal rate, regular rhythm and normal heart sounds. Pulmonary/Chest: Effort normal. No respiratory distress. She has decreased breath sounds (all fields). She has no wheezes. She has no rhonchi. She has no rales. Musculoskeletal: Normal range of motion. Lymphadenopathy:     She has no cervical adenopathy. Neurological: She is alert and oriented to person, place, and time. Coordination normal.   Skin: Skin is warm and dry. No rash noted. She is not diaphoretic. Psychiatric: She has a normal mood and affect. Her behavior is normal.   Nursing note and vitals reviewed. POC Testing Today: No results found for this visit on 11/24/18. Assessment & Plan    Diagnosis Orders   1.  Acute URI  XR CHEST STANDARD (2 VW) Cimetidine Counseling:  I discussed with the patient the risks of Cimetidine including but not limited to gynecomastia, headache, diarrhea, nausea, drowsiness, arrhythmias, pancreatitis, skin rashes, psychosis, bone marrow suppression and kidney toxicity.